# Patient Record
Sex: FEMALE | Race: WHITE | URBAN - NONMETROPOLITAN AREA
[De-identification: names, ages, dates, MRNs, and addresses within clinical notes are randomized per-mention and may not be internally consistent; named-entity substitution may affect disease eponyms.]

---

## 2017-01-19 ENCOUNTER — APPOINTMENT (OUTPATIENT)
Dept: RADIATION ONCOLOGY | Facility: HOSPITAL | Age: 67
End: 2017-01-19
Attending: RADIOLOGY
Payer: COMMERCIAL

## 2017-01-19 NOTE — PROGRESS NOTES
FOLLOWUP NOTE      REFERRING PHYSICIANS:  Delfino Fierro MD; Jules Prince MD; Louis Espino MD      DIAGNOSIS:  Infiltrating ductal carcinoma of the left breast, grade 1.  ER/IA positive, HER-2 negative.  Stage T1c N0 M0.  Two sentinel nodes negative.  A 1.6 cm primary tumor.      SUBJECTIVE:  Katelyn Harris returns for followup and evaluation today having undergone breast preserving surgery.  We had seen her on  after core biopsy only.  She did go to surgery with the above-described findings.  She tells me that she did have some drainage from her incision today and she actually describes a fair quantity of liquid drainage which she collected she says on 2 separate washcloths.  On examination currently, the incision looks quite dry, just a small area of eschar to the superior aspect of the medially located periareolar incision.  Typical postoperative induration is noted.  I really do not appreciate any fluctuance or seroma.      IMPRESSION:  Possible recent drainage as described by the patient.  She is in no hurry to get started with treatment, so to be safe I think we can go ahead and reevaluate her in 2 weeks for potentially continuing with her treatment simulation and planning.         SUZE TERRY MD             D: 2017 13:52   T: 2017 14:12   MT: FIONA      Name:     KATELYN HARRIS   MRN:      [id number]        Account:      [id number]   :      1950           Service Date: 2017      Document: [id number]       cc: Louis Fierro MD

## 2017-02-06 ENCOUNTER — ALLIED HEALTH/NURSE VISIT (OUTPATIENT)
Dept: RADIATION ONCOLOGY | Facility: HOSPITAL | Age: 67
End: 2017-02-06
Attending: RADIOLOGY
Payer: COMMERCIAL

## 2017-02-06 DIAGNOSIS — C50.912 MALIGNANT NEOPLASM OF LEFT FEMALE BREAST, UNSPECIFIED SITE OF BREAST: Primary | ICD-10-CM

## 2017-02-06 PROCEDURE — 77334 RADIATION TREATMENT AID(S): CPT | Performed by: RADIOLOGY

## 2017-02-06 PROCEDURE — 77290 THER RAD SIMULAJ FIELD CPLX: CPT | Performed by: RADIOLOGY

## 2017-02-06 NOTE — PROGRESS NOTES
RADIATION THERAPY SIMULATION NOTE      The patient was brought into the simulation suite and placed in a modified supine position.  Careful alignment was accomplished using orthogonal lasers.  Positioning aided with the use of a vacuum bag.  Arms were in the overhead position.  Treatment area involving the left breast and chest wall was outlined using CT opaque markers.  Imaging was acquired through the breast and chest area.  Isocenter placed.  All imaging will be used for 3D conformal treatment planning for treatment of breast carcinoma in the setting of breast preservation with dose/volume relationships characterized for target body, heart and lung.         SUZE TERRY MD             D: 2017 14:21   T: 2017 14:53   MT: ANDREW      Name:     KATELYN HARRIS   MRN:      [id number]        Account:      [id number]   :      1950           Service Date: 2017      Document: [id number]

## 2017-02-09 PROCEDURE — 77300 RADIATION THERAPY DOSE PLAN: CPT | Performed by: RADIOLOGY

## 2017-02-09 PROCEDURE — 77334 RADIATION TREATMENT AID(S): CPT | Performed by: RADIOLOGY

## 2017-02-09 PROCEDURE — 77295 3-D RADIOTHERAPY PLAN: CPT | Performed by: RADIOLOGY

## 2017-02-10 PROCEDURE — 77331 SPECIAL RADIATION DOSIMETRY: CPT | Performed by: RADIOLOGY

## 2017-02-10 PROCEDURE — 77370 RADIATION PHYSICS CONSULT: CPT | Performed by: RADIOLOGY

## 2017-02-14 ENCOUNTER — ALLIED HEALTH/NURSE VISIT (OUTPATIENT)
Dept: RADIATION ONCOLOGY | Facility: HOSPITAL | Age: 67
End: 2017-02-14
Attending: RADIOLOGY
Payer: COMMERCIAL

## 2017-02-14 DIAGNOSIS — C50.912 MALIGNANT NEOPLASM OF LEFT FEMALE BREAST, UNSPECIFIED SITE OF BREAST: Primary | ICD-10-CM

## 2017-02-14 PROCEDURE — 77280 THER RAD SIMULAJ FIELD SMPL: CPT | Performed by: RADIOLOGY

## 2017-02-14 PROCEDURE — 77417 THER RADIOLOGY PORT IMAGE(S): CPT | Performed by: RADIOLOGY

## 2017-02-14 PROCEDURE — 77412 RADIATION TX DELIVERY LVL 3: CPT | Performed by: RADIOLOGY

## 2017-02-14 NOTE — PROGRESS NOTES
Christie Jackson received radiation therapy treatment today 02/14/17.    Angela Carney  February 14, 2017  11:59 AM

## 2017-02-14 NOTE — MR AVS SNAPSHOT
After Visit Summary   2/14/2017    Christie Jackson    MRN: [id number]           Patient Information     Date Of Birth          1950        Visit Information        Provider Department      2/14/2017 11:30 AM [facility] IX [facility]        Today's Diagnoses     Malignant neoplasm of left female breast, unspecified site of breast (H)    -  1       Follow-ups after your visit        Your next 10 appointments already scheduled     Feb 15, 2017 10:30 AM CST   Treatment with [facility] CL[facility] 2100C   [facility] ([facility] )    [address] [address]   [phone]            Feb 16, 2017 10:30 AM CST   Treatment with HI CLINAC 2100C   [facility] ([facility] )    [address] [address]   [phone]            Feb 17, 2017 10:30 AM CST   Treatment with HI CLINAC 2100C   [facility] ([facility] )    [address] [address]   [phone]            Feb 20, 2017 10:30 AM CST   Treatment with HI CLINAC 2100C   [facility] ([facility] )    [address] [address]   [phone]            Feb 21, 2017 10:30 AM CST   Treatment with HI CLINAC 2100C   [facility] ([facility] )    [address] [address]   [phone]            Feb 22, 2017 10:30 AM CST   Treatment with HI CLINAC 2100C   [facility] ([facility] )    [address] [address]   [phone]            Feb 23, 2017 10:30 AM CST   Treatment with HI CLINAC 2100C   [facility] ([facility] )    [address] [address]   [phone]            Feb 24, 2017 10:30 AM CST   Treatment with HI CLINAC 2100C   [facility] ([facility] )    [address] [address]   [phone]            Feb 27, 2017 10:30 AM CST  "  Treatment with HI CLINAC 2100C   HI Radiation Oncology ([facility] )    [address] [address] [phone]            2017 10:30 AM CST   Treatment with HI CLINAC 2100C   [facility] Radiation Oncology ([facility] )    [address] [address] [phone]              Who to contact     If you have questions or need follow up information about today's clinic visit or your schedule please contact [facility] directly at [phone].  Normal or non-critical lab and imaging results will be communicated to you by Ounce Labshart, letter or phone within 4 business days after the clinic has received the results. If you do not hear from us within 7 days, please contact the clinic through Swiftypet or phone. If you have a critical or abnormal lab result, we will notify you by phone as soon as possible.  Submit refill requests through PushToTest or call your pharmacy and they will forward the refill request to us. Please allow 3 business days for your refill to be completed.          Additional Information About Your Visit        PushToTest Information     PushToTest lets you send messages to your doctor, view your test results, renew your prescriptions, schedule appointments and more. To sign up, go to [email or web address] . Click on \"Log in\" on the left side of the screen, which will take you to the Welcome page. Then click on \"Sign up Now\" on the right side of the page.     You will be asked to enter the access code listed below, as well as some personal information. Please follow the directions to create your username and password.     Your access code is: [email or web address]  Expires: 3/12/2017 10:41 AM     Your access code will  in 90 days. If you need help or a new code, please call your [place] clinic or [phone].        Care EveryWhere ID     This is your Care EveryWhere ID. This could be used by other organizations to access your [place] medical " records  [id number]         Blood Pressure from Last 3 Encounters:   12/12/16 180/88    Weight from Last 3 Encounters:   12/12/16 73.3 kg (161 lb 9.6 oz)              Today, you had the following     No orders found for display       Primary Care Provider Office Phone # Fax #    Jules Prince [phone]       [facility] RANGE [facility] [address]  [facility] [address]        Thank you!     Thank you for choosing [facility]  for your care. Our goal is always to provide you with excellent care. Hearing back from our patients is one way we can continue to improve our services. Please take a few minutes to complete the written survey that you may receive in the mail after your visit with us. Thank you!             Your Updated Medication List - Protect others around you: Learn how to safely use, store and throw away your medicines at www.disposemymeds.org.          This list is accurate as of: 2/14/17 11:59 AM.  Always use your most recent med list.                   Brand Name Dispense Instructions for use    ACTIGALL 300 MG capsule   Generic drug:  ursodiol          aspirin 81 MG tablet      Take 81 mg by mouth       betamethasone valerate 0.1 % ointment    VALISONE         FLUoxetine 20 MG capsule    PROzac         GLUCOSAMINE CHONDROITIN VIT D3 Caps      Take 1 each by mouth       lisinopril 40 MG tablet    PRINIVIL/ZESTRIL     Take 40 mg by mouth       melatonin 3 MG Caps          metFORMIN 1000 MG tablet    GLUCOPHAGE     Take 500 mg by mouth       metoprolol 25 MG 24 hr tablet    TOPROL-XL     Take 25 mg by mouth       PRESERVISION AREDS Tabs

## 2017-02-15 ENCOUNTER — ALLIED HEALTH/NURSE VISIT (OUTPATIENT)
Dept: RADIATION ONCOLOGY | Facility: HOSPITAL | Age: 67
End: 2017-02-15

## 2017-02-15 DIAGNOSIS — C50.912 MALIGNANT NEOPLASM OF LEFT FEMALE BREAST, UNSPECIFIED SITE OF BREAST: Primary | ICD-10-CM

## 2017-02-15 PROCEDURE — 77412 RADIATION TX DELIVERY LVL 3: CPT | Performed by: RADIOLOGY

## 2017-02-15 NOTE — PROGRESS NOTES
Christie Jackson received radiation therapy treatment today 02/15/17.    Lori Glez  February 15, 2017  9:30 AM

## 2017-02-15 NOTE — MR AVS SNAPSHOT
After Visit Summary   2/15/2017    Christie Jackson    MRN: [id number]           Patient Information     Date Of Birth          1950        Visit Information        Provider Department      2/15/2017 9:15 AM [facility] IX [facility] Rad[facility]        Today's Diagnoses     Malignant neoplasm of left female breast, unspecified site of breast (H)    -  1       Follow-ups after your visit        Your next 10 appointments already scheduled     Feb 16, 2017 12:00 PM CST   Treatment with HI CLINAC IX   [facility] Radiation Oncology ([facility] )    [address] [address]   [phone]            Feb 17, 2017  8:00 AM CST   Treatment with HI CLINAC IX   [facility] Radiation Oncology ([facility] )    [address] [address]   [phone]            Feb 20, 2017 10:30 AM CST   Treatment with HI CLINAC 2100C   [facility] Radiation Oncology ([facility] )    [address] [address]   [phone]            Feb 21, 2017 10:30 AM CST   Treatment with HI CLINAC 2100C   [facility] Radiation [facility]cology ([facility] )    [address] [address]   [phone]            Feb 22, 2017 10:30 AM CST   Treatment with HI CLINAC 2100C   [facility] Radiation Oncology ([facility] )    [address] [address]   [phone]            Feb 23, 2017 10:30 AM CST   Treatment with HI CLINAC 2100C   [facility] Radiation Oncology ([facility] )    [address] [address]   [phone]            Feb 24, 2017 10:30 AM CST   Treatment with HI CLINAC 2100C   [facility] Radiation [facility]cology ([facility] )    [address] [address]   [phone]            Feb 27, 2017 10:30 AM CST   Treatment with HI CLINAC 2100C   [facility] Rad[facility]cology ([facility] )    [address] [address]   [phone]            Feb 28, 2017 10:30 AM CST   Treatment with  "HI CLINAC 2100C   [facility] Radiation Oncology ([facility] )    [address] [address] [phone]            Mar 01, 2017 10:30 AM CST   Treatment with HI CLINAC 2100C   [facility] Radiation Oncology ([facility] )    [address] [address] [phone]              Who to contact     If you have questions or need follow up information about today's clinic visit or your schedule please contact [facility] directly at [phone].  Normal or non-critical lab and imaging results will be communicated to you by Beleza na Webhart, letter or phone within 4 business days after the clinic has received the results. If you do not hear from us within 7 days, please contact the clinic through Cutefundt or phone. If you have a critical or abnormal lab result, we will notify you by phone as soon as possible.  Submit refill requests through [employer] or call your pharmacy and they will forward the refill request to us. Please allow 3 business days for your refill to be completed.          Additional Information About Your Visit        [employer] Information     [employer] lets you send messages to your doctor, view your test results, renew your prescriptions, schedule appointments and more. To sign up, go to [email or web address] . Click on \"Log in\" on the left side of the screen, which will take you to the Welcome page. Then click on \"Sign up Now\" on the right side of the page.     You will be asked to enter the access code listed below, as well as some personal information. Please follow the directions to create your username and password.     Your access code is: [email or web address]  Expires: 3/12/2017 10:41 AM     Your access code will  in 90 days. If you need help or a new code, please call your [place] clinic or [phone].        Care EveryWhere ID     This is your Care EveryWhere ID. This could be used by other organizations to access your [place] medical " records  [id number]         Blood Pressure from Last 3 Encounters:   12/12/16 180/88    Weight from Last 3 Encounters:   12/12/16 73.3 kg (161 lb 9.6 oz)              Today, you had the following     No orders found for display       Primary Care Provider Office Phone # Fax #    Jules Prince [phone]       [facility] RANGE [facility] [address]  [facility] [address]        Thank you!     Thank you for choosing [facility]  for your care. Our goal is always to provide you with excellent care. Hearing back from our patients is one way we can continue to improve our services. Please take a few minutes to complete the written survey that you may receive in the mail after your visit with us. Thank you!             Your Updated Medication List - Protect others around you: Learn how to safely use, store and throw away your medicines at www.disposemymeds.org.          This list is accurate as of: 2/15/17  9:31 AM.  Always use your most recent med list.                   Brand Name Dispense Instructions for use    ACTIGALL 300 MG capsule   Generic drug:  ursodiol          aspirin 81 MG tablet      Take 81 mg by mouth       betamethasone valerate 0.1 % ointment    VALISONE         FLUoxetine 20 MG capsule    PROzac         GLUCOSAMINE CHONDROITIN VIT D3 Caps      Take 1 each by mouth       lisinopril 40 MG tablet    PRINIVIL/ZESTRIL     Take 40 mg by mouth       melatonin 3 MG Caps          metFORMIN 1000 MG tablet    GLUCOPHAGE     Take 500 mg by mouth       metoprolol 25 MG 24 hr tablet    TOPROL-XL     Take 25 mg by mouth       PRESERVISION AREDS Tabs

## 2017-02-16 ENCOUNTER — ALLIED HEALTH/NURSE VISIT (OUTPATIENT)
Dept: RADIATION ONCOLOGY | Facility: HOSPITAL | Age: 67
End: 2017-02-16

## 2017-02-16 DIAGNOSIS — C50.912 MALIGNANT NEOPLASM OF LEFT FEMALE BREAST, UNSPECIFIED SITE OF BREAST: Primary | ICD-10-CM

## 2017-02-16 PROCEDURE — 77412 RADIATION TX DELIVERY LVL 3: CPT | Performed by: RADIOLOGY

## 2017-02-16 NOTE — MR AVS SNAPSHOT
After Visit Summary   2/16/2017    Christie Jackson    MRN: [id number]           Patient Information     Date Of Birth          1950        Visit Information        Provider Department      2/16/2017 3:30 PM [facility] IX [facility] Rad[facility] Oncology        Today's Diagnoses     Malignant neoplasm of left female breast, unspecified site of breast (H)    -  1       Follow-ups after your visit        Your next 10 appointments already scheduled     Feb 17, 2017  8:00 AM CST   Treatment with HI CLINAC IX   [facility] Radiation Oncology ([facility] )    [address] [address]   [phone]            Feb 20, 2017 10:15 AM CST   Treatment with HI CLINAC IX   [facility] Radiation Oncology ([facility] )    [address] [address]   [phone]            Feb 21, 2017  2:15 PM CST   Treatment with HI CLINAC IX   [facility] Radiation Oncology ([facility] )    [address] [address]   [phone]            Feb 22, 2017 10:30 AM CST   Treatment with HI CLINAC 2100C   [facility] Radiation [facility]cology ([facility] )    [address] [address]   [phone]            Feb 23, 2017 10:30 AM CST   Treatment with HI CLINAC 2100C   [facility] Rad[facility] ([facility] )    [address] [address]   [phone]            Feb 24, 2017 10:30 AM CST   Treatment with HI CLINAC 2100C   [facility] Radiation Oncology ([facility] )    [address] [address]   [phone]            Feb 27, 2017 10:30 AM CST   Treatment with HI CLINAC 2100C   [facility] Radiation Oncology ([facility] )    [address] [address]   [phone]            Feb 28, 2017 10:30 AM CST   Treatment with HI CLINAC 2100C   [facility] Radiation [facility]cology ([facility] )    [address] [address]   [phone]            Mar 01, 2017 10:30 AM CST   Treatment with HI  "CLINAC 2100C   HI Radiation Oncology ([facility] )    [address] [address] [phone]            Mar 02, 2017 10:30 AM CST   Treatment with HI CLINAC 2100C   [facility] Radiation Oncology ([facility] )    [address] [address] [phone]              Who to contact     If you have questions or need follow up information about today's clinic visit or your schedule please contact [facility] directly at [phone].  Normal or non-critical lab and imaging results will be communicated to you by GoNogginghart, letter or phone within 4 business days after the clinic has received the results. If you do not hear from us within 7 days, please contact the clinic through BAUNATt or phone. If you have a critical or abnormal lab result, we will notify you by phone as soon as possible.  Submit refill requests through Niche or call your pharmacy and they will forward the refill request to us. Please allow 3 business days for your refill to be completed.          Additional Information About Your Visit        Niche Information     Niche lets you send messages to your doctor, view your test results, renew your prescriptions, schedule appointments and more. To sign up, go to [email or web address] . Click on \"Log in\" on the left side of the screen, which will take you to the Welcome page. Then click on \"Sign up Now\" on the right side of the page.     You will be asked to enter the access code listed below, as well as some personal information. Please follow the directions to create your username and password.     Your access code is: [email or web address]  Expires: 3/12/2017 10:41 AM     Your access code will  in 90 days. If you need help or a new code, please call your [place] clinic or [phone].        Care EveryWhere ID     This is your Care EveryWhere ID. This could be used by other organizations to access your [place] medical " records  [id number]         Blood Pressure from Last 3 Encounters:   12/12/16 180/88    Weight from Last 3 Encounters:   12/12/16 73.3 kg (161 lb 9.6 oz)              Today, you had the following     No orders found for display       Primary Care Provider Office Phone # Fax #    Jules Prince [phone]       [facility] RANGE [facility] [address]  [facility] [address]        Thank you!     Thank you for choosing [facility]  for your care. Our goal is always to provide you with excellent care. Hearing back from our patients is one way we can continue to improve our services. Please take a few minutes to complete the written survey that you may receive in the mail after your visit with us. Thank you!             Your Updated Medication List - Protect others around you: Learn how to safely use, store and throw away your medicines at www.disposemymeds.org.          This list is accurate as of: 2/16/17  4:03 PM.  Always use your most recent med list.                   Brand Name Dispense Instructions for use    ACTIGALL 300 MG capsule   Generic drug:  ursodiol          aspirin 81 MG tablet      Take 81 mg by mouth       betamethasone valerate 0.1 % ointment    VALISONE         FLUoxetine 20 MG capsule    PROzac         GLUCOSAMINE CHONDROITIN VIT D3 Caps      Take 1 each by mouth       lisinopril 40 MG tablet    PRINIVIL/ZESTRIL     Take 40 mg by mouth       melatonin 3 MG Caps          metFORMIN 1000 MG tablet    GLUCOPHAGE     Take 500 mg by mouth       metoprolol 25 MG 24 hr tablet    TOPROL-XL     Take 25 mg by mouth       PRESERVISION AREDS Tabs

## 2017-02-16 NOTE — PROGRESS NOTES
Christie Jackson received radiation therapy treatment today 02/16/17.    Piotr Rodriguez  February 16, 2017  4:03 PM

## 2017-02-17 ENCOUNTER — ALLIED HEALTH/NURSE VISIT (OUTPATIENT)
Dept: RADIATION ONCOLOGY | Facility: HOSPITAL | Age: 67
End: 2017-02-17

## 2017-02-17 DIAGNOSIS — C50.912 MALIGNANT NEOPLASM OF LEFT FEMALE BREAST, UNSPECIFIED SITE OF BREAST: Primary | ICD-10-CM

## 2017-02-17 PROCEDURE — 77412 RADIATION TX DELIVERY LVL 3: CPT | Performed by: RADIOLOGY

## 2017-02-17 NOTE — PROGRESS NOTES
Christie Jackson received radiation therapy treatment today 02/17/17.    Suman Leonardo  February 17, 2017  8:04 AM

## 2017-02-17 NOTE — MR AVS SNAPSHOT
After Visit Summary   2/17/2017    Christie Jackson    MRN: [id number]           Patient Information     Date Of Birth          1950        Visit Information        Provider Department      2/17/2017 8:00 AM [facility]        Today's Diagnoses     Malignant neoplasm of left female breast, unspecified site of breast (H)    -  1       Follow-ups after your visit        Your next 10 appointments already scheduled     Feb 20, 2017 10:15 AM CST   Treatment with HI CL[facility] IX   [facility] Rad[facility]cology ([facility] )    [address] [address]   [phone]            Feb 21, 2017  2:15 PM CST   Treatment with [facility] IX   [facility] ([facility] )    [address] [address]   [phone]            Feb 22, 2017 10:30 AM CST   Treatment with HI CLINAC 2100C   [facility] Radiation Oncology ([facility] )    [address] [address]   [phone]            Feb 23, 2017 10:30 AM CST   Treatment with HI CLINAC 2100C   [facility] Rad[facility]cology ([facility] )    [address] [address]   [phone]            Feb 24, 2017 10:30 AM CST   Treatment with HI CLINAC 2100C   [facility] ([facility] )    [address] [address]   [phone]            Feb 27, 2017 10:30 AM CST   Treatment with HI CLINAC 2100C   [facility] ([facility] )    [address] [address]   [phone]            Feb 28, 2017 10:30 AM CST   Treatment with HI CLINAC 2100C   [facility] ([facility] )    [address] [address]   [phone]            Mar 01, 2017 10:30 AM CST   Treatment with HI CLINAC 2100C   [facility] ([facility] )    [address] [address]   [phone]            Mar 02, 2017 10:30 AM CST   Treatment with  "HI CLINAC 2100C   HI Radiation Oncology ([facility] )    [address] [address] [phone]            Mar 03, 2017 10:30 AM CST   Treatment with HI CLINAC 2100C   [facility] Radiation Oncology ([facility] )    [address] [address] [phone]              Who to contact     If you have questions or need follow up information about today's clinic visit or your schedule please contact [facility] directly at [phone].  Normal or non-critical lab and imaging results will be communicated to you by Melon #usemelonhart, letter or phone within 4 business days after the clinic has received the results. If you do not hear from us within 7 days, please contact the clinic through Numerext or phone. If you have a critical or abnormal lab result, we will notify you by phone as soon as possible.  Submit refill requests through [employer] or call your pharmacy and they will forward the refill request to us. Please allow 3 business days for your refill to be completed.          Additional Information About Your Visit        [employer] Information     [employer] lets you send messages to your doctor, view your test results, renew your prescriptions, schedule appointments and more. To sign up, go to [email or web address] . Click on \"Log in\" on the left side of the screen, which will take you to the Welcome page. Then click on \"Sign up Now\" on the right side of the page.     You will be asked to enter the access code listed below, as well as some personal information. Please follow the directions to create your username and password.     Your access code is: [email or web address]  Expires: 3/12/2017 10:41 AM     Your access code will  in 90 days. If you need help or a new code, please call your [place] clinic or [phone].        Care EveryWhere ID     This is your Care EveryWhere ID. This could be used by other organizations to access your [place] medical " records  [id number]         Blood Pressure from Last 3 Encounters:   12/12/16 180/88    Weight from Last 3 Encounters:   12/12/16 73.3 kg (161 lb 9.6 oz)              Today, you had the following     No orders found for display       Primary Care Provider Office Phone # Fax #    Jules Prince [phone]       [facility] RANGE [facility] [address]  [facility] [address]        Thank you!     Thank you for choosing [facility]  for your care. Our goal is always to provide you with excellent care. Hearing back from our patients is one way we can continue to improve our services. Please take a few minutes to complete the written survey that you may receive in the mail after your visit with us. Thank you!             Your Updated Medication List - Protect others around you: Learn how to safely use, store and throw away your medicines at www.disposemymeds.org.          This list is accurate as of: 2/17/17  8:06 AM.  Always use your most recent med list.                   Brand Name Dispense Instructions for use    ACTIGALL 300 MG capsule   Generic drug:  ursodiol          aspirin 81 MG tablet      Take 81 mg by mouth       betamethasone valerate 0.1 % ointment    VALISONE         FLUoxetine 20 MG capsule    PROzac         GLUCOSAMINE CHONDROITIN VIT D3 Caps      Take 1 each by mouth       lisinopril 40 MG tablet    PRINIVIL/ZESTRIL     Take 40 mg by mouth       melatonin 3 MG Caps          metFORMIN 1000 MG tablet    GLUCOPHAGE     Take 500 mg by mouth       metoprolol 25 MG 24 hr tablet    TOPROL-XL     Take 25 mg by mouth       PRESERVISION AREDS Tabs

## 2017-02-20 ENCOUNTER — ALLIED HEALTH/NURSE VISIT (OUTPATIENT)
Dept: RADIATION ONCOLOGY | Facility: HOSPITAL | Age: 67
End: 2017-02-20

## 2017-02-20 DIAGNOSIS — C50.912 MALIGNANT NEOPLASM OF LEFT FEMALE BREAST, UNSPECIFIED SITE OF BREAST: Primary | ICD-10-CM

## 2017-02-20 PROCEDURE — 77412 RADIATION TX DELIVERY LVL 3: CPT | Performed by: RADIOLOGY

## 2017-02-20 PROCEDURE — 77336 RADIATION PHYSICS CONSULT: CPT | Performed by: RADIOLOGY

## 2017-02-20 NOTE — PROGRESS NOTES
Christie Jackson received radiation therapy treatment today 02/20/17.    Suman Leonardo  February 20, 2017  10:20 AM

## 2017-02-20 NOTE — MR AVS SNAPSHOT
After Visit Summary   2/20/2017    Christie Jackson    MRN: [id number]           Patient Information     Date Of Birth          1950        Visit Information        Provider Department      2/20/2017 10:15 AM [facility]        Today's Diagnoses     Malignant neoplasm of left female breast, unspecified site of breast (H)    -  1       Follow-ups after your visit        Your next 10 appointments already scheduled     Feb 21, 2017  2:15 PM CST   Treatment with HI CLINAC IX   [facility] Radiation Oncology ([facility] )    [address] [address]   [phone]            Feb 22, 2017 10:30 AM CST   Treatment with HI CLINAC 2100C   [facility] Radiation Oncology ([facility] )    [address] [address]   [phone]            Feb 23, 2017 10:30 AM CST   Treatment with HI CLINAC 2100C   [facility] Radiation Oncology ([facility] )    [address] [address]   [phone]            Feb 24, 2017 10:30 AM CST   Treatment with HI CLINAC 2100C   [facility] Radiation Oncology ([facility] )    [address] [address]   [phone]            Feb 27, 2017 10:30 AM CST   Treatment with HI CLINAC 2100C   [facility] Radiation Oncology ([facility] )    [address] [address]   [phone]            Feb 28, 2017 10:30 AM CST   Treatment with HI CLINAC 2100C   [facility] Radiation Oncology ([facility] )    [address] [address]   [phone]            Mar 01, 2017 10:30 AM CST   Treatment with HI CLINAC 2100C   [facility] Radiation Oncology ([facility] )    [address] [address]   [phone]            Mar 02, 2017 10:30 AM CST   Treatment with HI CLINAC 2100C   [facility] Radiation Oncology ([facility] )    [address] [address]   [phone]            Mar 03, 2017 10:30 AM CST   Treatment  "with HI CLINAC 2100C   [facility] Radiation Oncology ([facility] )    [address] [address] [phone]            Mar 06, 2017 10:30 AM CST   Treatment with HI CLINAC 2100C   [facility] Radiation Oncology ([facility] )    [address] [address] [phone]              Who to contact     If you have questions or need follow up information about today's clinic visit or your schedule please contact [facility] directly at [phone].  Normal or non-critical lab and imaging results will be communicated to you by MideoMehart, letter or phone within 4 business days after the clinic has received the results. If you do not hear from us within 7 days, please contact the clinic through CellAegis Devicest or phone. If you have a critical or abnormal lab result, we will notify you by phone as soon as possible.  Submit refill requests through Aasonn or call your pharmacy and they will forward the refill request to us. Please allow 3 business days for your refill to be completed.          Additional Information About Your Visit        Aasonn Information     Aasonn lets you send messages to your doctor, view your test results, renew your prescriptions, schedule appointments and more. To sign up, go to [email or web address] . Click on \"Log in\" on the left side of the screen, which will take you to the Welcome page. Then click on \"Sign up Now\" on the right side of the page.     You will be asked to enter the access code listed below, as well as some personal information. Please follow the directions to create your username and password.     Your access code is: [email or web address]  Expires: 3/12/2017 10:41 AM     Your access code will  in 90 days. If you need help or a new code, please call your [place] clinic or [phone].        Care EveryWhere ID     This is your Care EveryWhere ID. This could be used by other organizations to access your [place] medical " records  [id number]         Blood Pressure from Last 3 Encounters:   12/12/16 180/88    Weight from Last 3 Encounters:   12/12/16 73.3 kg (161 lb 9.6 oz)              Today, you had the following     No orders found for display       Primary Care Provider Office Phone # Fax #    Jules Prince [phone]       [facility] RANGE [facility] [address]  [facility] [address]        Thank you!     Thank you for choosing [facility]  for your care. Our goal is always to provide you with excellent care. Hearing back from our patients is one way we can continue to improve our services. Please take a few minutes to complete the written survey that you may receive in the mail after your visit with us. Thank you!             Your Updated Medication List - Protect others around you: Learn how to safely use, store and throw away your medicines at www.disposemymeds.org.          This list is accurate as of: 2/20/17 10:43 AM.  Always use your most recent med list.                   Brand Name Dispense Instructions for use    ACTIGALL 300 MG capsule   Generic drug:  ursodiol          aspirin 81 MG tablet      Take 81 mg by mouth       betamethasone valerate 0.1 % ointment    VALISONE         FLUoxetine 20 MG capsule    PROzac         GLUCOSAMINE CHONDROITIN VIT D3 Caps      Take 1 each by mouth       lisinopril 40 MG tablet    PRINIVIL/ZESTRIL     Take 40 mg by mouth       melatonin 3 MG Caps          metFORMIN 1000 MG tablet    GLUCOPHAGE     Take 500 mg by mouth       metoprolol 25 MG 24 hr tablet    TOPROL-XL     Take 25 mg by mouth       PRESERVISION AREDS Tabs

## 2017-02-21 ENCOUNTER — ALLIED HEALTH/NURSE VISIT (OUTPATIENT)
Dept: RADIATION ONCOLOGY | Facility: HOSPITAL | Age: 67
End: 2017-02-21

## 2017-02-21 DIAGNOSIS — C50.912 MALIGNANT NEOPLASM OF LEFT FEMALE BREAST, UNSPECIFIED SITE OF BREAST: Primary | ICD-10-CM

## 2017-02-21 PROCEDURE — 77417 THER RADIOLOGY PORT IMAGE(S): CPT | Performed by: RADIOLOGY

## 2017-02-21 PROCEDURE — 77412 RADIATION TX DELIVERY LVL 3: CPT | Performed by: RADIOLOGY

## 2017-02-21 NOTE — PROGRESS NOTES
Christie Jackson received radiation therapy treatment today 02/21/17.    Suman Leonardo  February 21, 2017  2:34 PM

## 2017-02-21 NOTE — MR AVS SNAPSHOT
After Visit Summary   2/21/2017    Christie Jackson    MRN: [id number]           Patient Information     Date Of Birth          1950        Visit Information        Provider Department      2/21/2017 2:15 PM [facility] Rad[facility]cology        Today's Diagnoses     Malignant neoplasm of left female breast, unspecified site of breast (H)    -  1       Follow-ups after your visit        Your next 10 appointments already scheduled     Feb 22, 2017 10:30 AM CST   Treatment with HI CLINAC 2100C   [facility] Radiation Oncology ([facility] )    [address] [address]   [phone]            Feb 23, 2017 10:30 AM CST   Treatment with HI CLINAC 2100C   [facility] Radiation Oncology ([facility] )    [address] [address]   [phone]            Feb 24, 2017 10:30 AM CST   Treatment with HI CLINAC 2100C   [facility] Radiation Oncology ([facility] )    [address] [address]   [phone]            Feb 27, 2017 10:30 AM CST   Treatment with HI CLINAC 2100C   [facility] Radiation Oncology ([facility] )    [address] [address]   [phone]            Feb 28, 2017 10:30 AM CST   Treatment with HI CLINAC 2100C   [facility] Radiation Oncology ([facility] )    [address] [address]   [phone]            Mar 01, 2017 10:30 AM CST   Treatment with HI CLINAC 2100C   [facility] Radiation Oncology ([facility] )    [address] [address]   [phone]            Mar 02, 2017 10:30 AM CST   Treatment with HI CLINAC 2100C   [facility] Radiation Oncology ([facility] )    [address] [address]   [phone]            Mar 03, 2017 10:30 AM CST   Treatment with HI CLINAC 2100C   [facility] Radiation Oncology ([facility] )    [address] [address]   [phone]            Mar 06, 2017 10:30 AM CST  "  Treatment with HI CLINAC 2100C   HI Radiation Oncology ([facility] )    [address] [address] [phone]            Mar 07, 2017 10:30 AM CST   Treatment with HI CLINAC 2100C   [facility] Radiation Oncology ([facility] )    [address] [address] [phone]              Who to contact     If you have questions or need follow up information about today's clinic visit or your schedule please contact [facility] directly at [phone].  Normal or non-critical lab and imaging results will be communicated to you by Bensussen Deutschhart, letter or phone within 4 business days after the clinic has received the results. If you do not hear from us within 7 days, please contact the clinic through iGoOn s.r.l.t or phone. If you have a critical or abnormal lab result, we will notify you by phone as soon as possible.  Submit refill requests through Billtrust or call your pharmacy and they will forward the refill request to us. Please allow 3 business days for your refill to be completed.          Additional Information About Your Visit        Billtrust Information     Billtrust lets you send messages to your doctor, view your test results, renew your prescriptions, schedule appointments and more. To sign up, go to [email or web address] . Click on \"Log in\" on the left side of the screen, which will take you to the Welcome page. Then click on \"Sign up Now\" on the right side of the page.     You will be asked to enter the access code listed below, as well as some personal information. Please follow the directions to create your username and password.     Your access code is: [email or web address]  Expires: 3/12/2017 10:41 AM     Your access code will  in 90 days. If you need help or a new code, please call your [place] clinic or [phone].        Care EveryWhere ID     This is your Care EveryWhere ID. This could be used by other organizations to access your [place] medical " records  [id number]         Blood Pressure from Last 3 Encounters:   12/12/16 180/88    Weight from Last 3 Encounters:   12/12/16 73.3 kg (161 lb 9.6 oz)              Today, you had the following     No orders found for display       Primary Care Provider Office Phone # Fax #    Jules Prince [phone]       [facility] RANGE [facility] [address]  [facility] [address]        Thank you!     Thank you for choosing [facility]  for your care. Our goal is always to provide you with excellent care. Hearing back from our patients is one way we can continue to improve our services. Please take a few minutes to complete the written survey that you may receive in the mail after your visit with us. Thank you!             Your Updated Medication List - Protect others around you: Learn how to safely use, store and throw away your medicines at www.disposemymeds.org.          This list is accurate as of: 2/21/17  2:34 PM.  Always use your most recent med list.                   Brand Name Dispense Instructions for use    ACTIGALL 300 MG capsule   Generic drug:  ursodiol          aspirin 81 MG tablet      Take 81 mg by mouth       betamethasone valerate 0.1 % ointment    VALISONE         FLUoxetine 20 MG capsule    PROzac         GLUCOSAMINE CHONDROITIN VIT D3 Caps      Take 1 each by mouth       lisinopril 40 MG tablet    PRINIVIL/ZESTRIL     Take 40 mg by mouth       melatonin 3 MG Caps          metFORMIN 1000 MG tablet    GLUCOPHAGE     Take 500 mg by mouth       metoprolol 25 MG 24 hr tablet    TOPROL-XL     Take 25 mg by mouth       PRESERVISION AREDS Tabs

## 2017-02-22 ENCOUNTER — ALLIED HEALTH/NURSE VISIT (OUTPATIENT)
Dept: RADIATION ONCOLOGY | Facility: HOSPITAL | Age: 67
End: 2017-02-22
Attending: RADIOLOGY
Payer: COMMERCIAL

## 2017-02-22 ENCOUNTER — OFFICE VISIT (OUTPATIENT)
Dept: RADIATION ONCOLOGY | Facility: HOSPITAL | Age: 67
End: 2017-02-22

## 2017-02-22 VITALS
RESPIRATION RATE: 16 BRPM | HEART RATE: 64 BPM | DIASTOLIC BLOOD PRESSURE: 84 MMHG | WEIGHT: 162.2 LBS | BODY MASS INDEX: 26.99 KG/M2 | SYSTOLIC BLOOD PRESSURE: 182 MMHG

## 2017-02-22 DIAGNOSIS — C50.912 MALIGNANT NEOPLASM OF LEFT FEMALE BREAST, UNSPECIFIED SITE OF BREAST: Primary | ICD-10-CM

## 2017-02-22 PROCEDURE — 77412 RADIATION TX DELIVERY LVL 3: CPT | Performed by: RADIOLOGY

## 2017-02-22 ASSESSMENT — PAIN SCALES - GENERAL: PAINLEVEL: NO PAIN (0)

## 2017-02-22 NOTE — PROGRESS NOTES
Christie Jackson received radiation therapy treatment today 02/22/17.    Piotr Rodriguez  February 22, 2017  10:44 AM

## 2017-02-22 NOTE — MR AVS SNAPSHOT
After Visit Summary   2/22/2017    Christie Jackson    MRN: [id number]           Patient Information     Date Of Birth          1950        Visit Information        Provider Department      2/22/2017 10:45 AM Star Maldonado MD [facility] Radiation [facility]cology        Today's Diagnoses     Malignant neoplasm of left female breast, unspecified site of breast (H)    -  1       Follow-ups after your visit        Your next 10 appointments already scheduled     Feb 23, 2017 10:30 AM CST   Treatment with HI CLINAC 2100C   [facility] Radiation Oncology ([facility] )    [address] [address]   [phone]            Feb 24, 2017 10:30 AM CST   Treatment with HI CLINAC 2100C   [facility] Radiation Oncology ([facility] )    [address] [address]   [phone]            Feb 27, 2017 10:30 AM CST   Treatment with HI CLINAC 2100C   [facility] Radiation Oncology ([facility] )    [address] [address]   [phone]            Feb 28, 2017 10:30 AM CST   Treatment with HI CLINAC 2100C   [facility] Radiation Oncology ([facility] )    [address] [address]   [phone]            Mar 01, 2017 10:30 AM CST   Treatment with HI CLINAC 2100C   [facility] Radiation Oncology ([facility] )    [address] [address]   [phone]            Mar 01, 2017 10:45 AM CST   on treatment visit with Star Maldonado MD   [facility] ([facility] )    [address] [address]   [phone]            Mar 02, 2017 10:30 AM CST   Treatment with HI CLINAC 2100C   [facility] Radiation Oncology ([facility] )    [address] [address]   [phone]            Mar 03, 2017 10:30 AM CST   Treatment with HI CLINAC 2100C   [facility] Radiation Oncology ([facility] )    [address] [address]   [phone]            Mar 06, 2017  "10:30 AM CST   Treatment with HI CLINAC 2100C   [facility] Radiation Oncology ([facility] )    [address] [address] [phone]            Mar 07, 2017 10:30 AM CST   Treatment with HI CLINAC 2100C   [facility] Radiation Oncology ([facility] )    [address] [address] [phone]              Who to contact     If you have questions or need follow up information about today's clinic visit or your schedule please contact [facility] directly at [phone].  Normal or non-critical lab and imaging results will be communicated to you by Five Apeshart, letter or phone within 4 business days after the clinic has received the results. If you do not hear from us within 7 days, please contact the clinic through Five Apeshart or phone. If you have a critical or abnormal lab result, we will notify you by phone as soon as possible.  Submit refill requests through [employer]Met[employer] or call your pharmacy and they will forward the refill request to us. Please allow 3 business days for your refill to be completed.          Additional Information About Your Visit        MyChart Information     ThreatMetrix lets you send messages to your doctor, view your test results, renew your prescriptions, schedule appointments and more. To sign up, go to [email or web address] . Click on \"Log in\" on the left side of the screen, which will take you to the Welcome page. Then click on \"Sign up Now\" on the right side of the page.     You will be asked to enter the access code listed below, as well as some personal information. Please follow the directions to create your username and password.     Your access code is: [email or web address]  Expires: 3/12/2017 10:41 AM     Your access code will  in 90 days. If you need help or a new code, please call your [place] clinic or [phone].        Care EveryWhere ID     This is your Care EveryWhere ID. This could be used by other organizations to access your " [place] medical records  [id number]        Your Vitals Were     Pulse Respirations BMI (Body Mass Index)             64 16 26.99 kg/m2          Blood Pressure from Last 3 Encounters:   02/22/17 182/84   12/12/16 180/88    Weight from Last 3 Encounters:   02/22/17 73.6 kg (162 lb 3.2 oz)   12/12/16 73.3 kg (161 lb 9.6 oz)              Today, you had the following     No orders found for display       Primary Care Provider Office Phone # Fax #    Jules PATHAK Niki [phone]       [facility] RANGE [facility] [address] [facility] [address]        Thank you!     Thank you for choosing [facility]  for your care. Our goal is always to provide you with excellent care. Hearing back from our patients is one way we can continue to improve our services. Please take a few minutes to complete the written survey that you may receive in the mail after your visit with us. Thank you!             Your Updated Medication List - Protect others around you: Learn how to safely use, store and throw away your medicines at www.disposemymeds.org.          This list is accurate as of: 2/22/17 11:00 AM.  Always use your most recent med list.                   Brand Name Dispense Instructions for use    ACTIGALL 300 MG capsule   Generic drug:  ursodiol          aspirin 81 MG tablet      Take 81 mg by mouth       betamethasone valerate 0.1 % ointment    VALISONE         FLUoxetine 20 MG capsule    PROzac         GLUCOSAMINE CHONDROITIN VIT D3 Caps      Take 1 each by mouth       lisinopril 40 MG tablet    PRINIVIL/ZESTRIL     Take 40 mg by mouth       MAGNESIUM OXIDE PO      Take 1 capsule by mouth daily       melatonin 3 MG Caps          metFORMIN 1000 MG tablet    GLUCOPHAGE     Take 500 mg by mouth       metoprolol 25 MG 24 hr tablet    TOPROL-XL     Take 25 mg by mouth       PRESERVISION AREDS Tabs          TURMERIC PO      1 each

## 2017-02-22 NOTE — PROGRESS NOTES
RADIATION THERAPY PROGRESS NOTE      DIAGNOSIS:  Infiltrating ductal carcinoma of the left breast, grade 1, 1.6 cm primary tumor.  Stage T1c N0 M0.  ER/NV positive, HER-2 negative.      RADIATION RX:  The patient has received 1400 cGy to date for treatment of low risk breast carcinoma in the setting of breast preservation.      SUBJECTIVE:  Just getting started with treatment, notes little in the way of side effects.      OBJECTIVE:  Weight 162.2 pounds, very mild erythema, likely related to altered lymphatic drainage around her segmentectomy site.  Slight skin puckering at the incision in the inferomedial aspect of the periareolar area.      IMPRESSION:  Just getting started with radiation therapy, doing fine.      PLAN:  Continue treatment as planned.         SUZE TERRY MD             D: 2017 11:19   T: 2017 11:59   MT: SK      Name:     KATELYN HARRIS   MRN:      [id number]        Account:      [id number]   :      1950           Service Date: 2017      Document: [id number]

## 2017-02-22 NOTE — MR AVS SNAPSHOT
After Visit Summary   2/22/2017    Christie Jackson    MRN: [id number]           Patient Information     Date Of Birth          1950        Visit Information        Provider Department      2/22/2017 10:30 AM [facility]        Today's Diagnoses     Malignant neoplasm of left female breast, unspecified site of breast (H)    -  1       Follow-ups after your visit        Your next 10 appointments already scheduled     Feb 22, 2017 10:45 AM CST   on treatment visit with Star Maldonado MD   [facility] ([facility] )    [address] [address]   [phone]            Feb 23, 2017 10:30 AM CST   Treatment with [facility]   [facility] ([facility] )    [address] [address]   [phone]            Feb 24, 2017 10:30 AM CST   Treatment with [facility]   [facility] ([facility] )    [address] [address]   [phone]            Feb 27, 2017 10:30 AM CST   Treatment with [facility]   [facility] ([facility] )    [address] [address]   [phone]            Feb 28, 2017 10:30 AM CST   Treatment with [facility]   [facility] ([facility] )    [address] [address]   [phone]            Mar 01, 2017 10:30 AM CST   Treatment with [facility]   [facility] ([facility] )    [address] [address] [phone]            Mar 01, 2017 10:45 AM CST   on treatment visit with Star Maldonado MD   [facility] ([facility] )    [address] [address]   [phone]            Mar 02, 2017 10:30 AM CST   Treatment with [facility]   [facility] ([facility] )    [address] [address]   [phone]            Mar  "2017 10:30 AM CST   Treatment with [facility] 2100C   [facility] ([facility] )    [address] [address] [phone]            Mar 06, 2017 10:30 AM CST   Treatment with [facility] 2100C   [facility] ([facility] )    [address] [address] [phone]              Who to contact     If you have questions or need follow up information about today's clinic visit or your schedule please contact [facility] directly at [phone].  Normal or non-critical lab and imaging results will be communicated to you by Treemo Labshart, letter or phone within 4 business days after the clinic has received the results. If you do not hear from us within 7 days, please contact the clinic through Scopelyt or phone. If you have a critical or abnormal lab result, we will notify you by phone as soon as possible.  Submit refill requests through FAMOCO or call your pharmacy and they will forward the refill request to us. Please allow 3 business days for your refill to be completed.          Additional Information About Your Visit        MyChart Information     FAMOCO lets you send messages to your doctor, view your test results, renew your prescriptions, schedule appointments and more. To sign up, go to [email or web address] . Click on \"Log in\" on the left side of the screen, which will take you to the Welcome page. Then click on \"Sign up Now\" on the right side of the page.     You will be asked to enter the access code listed below, as well as some personal information. Please follow the directions to create your username and password.     Your access code is: [email or web address]  Expires: 3/12/2017 10:41 AM     Your access code will  in 90 days. If you need help or a new code, please call your [place] clinic or [phone].        Care EveryWhere ID     This is your Care EveryWhere ID. This could be used by other organizations to access " your [place] medical records  [id number]         Blood Pressure from Last 3 Encounters:   12/12/16 180/88    Weight from Last 3 Encounters:   12/12/16 73.3 kg (161 lb 9.6 oz)              Today, you had the following     No orders found for display       Primary Care Provider Office Phone # Fax #    Jules W Niki [phone]       [facility] [address]  [facility] [address]        Thank you!     Thank you for choosing [facility]  for your care. Our goal is always to provide you with excellent care. Hearing back from our patients is one way we can continue to improve our services. Please take a few minutes to complete the written survey that you may receive in the mail after your visit with us. Thank you!             Your Updated Medication List - Protect others around you: Learn how to safely use, store and throw away your medicines at www.disposemymeds.org.          This list is accurate as of: 2/22/17 10:44 AM.  Always use your most recent med list.                   Brand Name Dispense Instructions for use    ACTIGALL 300 MG capsule   Generic drug:  ursodiol          aspirin 81 MG tablet      Take 81 mg by mouth       betamethasone valerate 0.1 % ointment    VALISONE         FLUoxetine 20 MG capsule    PROzac         GLUCOSAMINE CHONDROITIN VIT D3 Caps      Take 1 each by mouth       lisinopril 40 MG tablet    PRINIVIL/ZESTRIL     Take 40 mg by mouth       melatonin 3 MG Caps          metFORMIN 1000 MG tablet    GLUCOPHAGE     Take 500 mg by mouth       metoprolol 25 MG 24 hr tablet    TOPROL-XL     Take 25 mg by mouth       PRESERVISION AREDS Tabs

## 2017-02-22 NOTE — PROGRESS NOTES
"Patient was assessed using the NCCN psychosocial distress thermometer. Patient rated the score as a 4. Patient rated current stressors as \"coming here for txs\". Stressors will be brought to the attention of provider or Oncology RN Care Coordinator for a score of 6 or greater or per nurses discretion.           "

## 2017-02-23 ENCOUNTER — ALLIED HEALTH/NURSE VISIT (OUTPATIENT)
Dept: RADIATION ONCOLOGY | Facility: HOSPITAL | Age: 67
End: 2017-02-23

## 2017-02-23 DIAGNOSIS — C50.912 MALIGNANT NEOPLASM OF LEFT FEMALE BREAST, UNSPECIFIED SITE OF BREAST: Primary | ICD-10-CM

## 2017-02-23 PROCEDURE — 77412 RADIATION TX DELIVERY LVL 3: CPT | Performed by: RADIOLOGY

## 2017-02-23 NOTE — PROGRESS NOTES
Christie Jackson received radiation therapy treatment today 02/23/17.    Lori Glez  February 23, 2017  10:59 AM

## 2017-02-23 NOTE — MR AVS SNAPSHOT
After Visit Summary   2/23/2017    Christie Jackson    MRN: [id number]           Patient Information     Date Of Birth          1950        Visit Information        Provider Department      2/23/2017 10:30 AM [facility]        Today's Diagnoses     Malignant neoplasm of left female breast, unspecified site of breast (H)    -  1       Follow-ups after your visit        Your next 10 appointments already scheduled     Feb 24, 2017 10:30 AM CST   Treatment with [facility]   [facility] ([facility] )    [address] [address]   [phone]            Feb 27, 2017 10:30 AM CST   Treatment with [facility] 2100C   [facility] ([facility] )    [address] [address]   [phone]            Feb 28, 2017 10:30 AM CST   Treatment with [facility]   [facility] ([facility] )    [address] [address]   [phone]            Mar 01, 2017 10:30 AM CST   Treatment with [facility]   [facility] ([facility] )    [address] [address]   [phone]            Mar 01, 2017 10:45 AM CST   on treatment visit with Star Maldonado MD   [facility] ([facility] )    [address] [address]   [phone]            Mar 02, 2017 10:30 AM CST   Treatment with [facility]   [facility] ([facility] )    [address] [address]   [phone]            Mar 03, 2017 10:30 AM CST   Treatment with [facility]   [facility] ([facility] )    [address] [address]   [phone]            Mar 06, 2017 10:30 AM CST   Treatment with [facility]   [facility] ([facility] )    [address] [address]   [phone]            Mar 07, 2017 10:30  "AM CST   Treatment with [facility] 2100C   [facility] Rad[facility] Oncology ([facility] )    [address] [address] [phone]            Mar 08, 2017 10:30 AM CST   Treatment with [facility] 2100C   [facility] ([facility] )    [address] [address] [phone]              Who to contact     If you have questions or need follow up information about today's clinic visit or your schedule please contact [facility] directly at [phone].  Normal or non-critical lab and imaging results will be communicated to you by Alektronahart, letter or phone within 4 business days after the clinic has received the results. If you do not hear from us within 7 days, please contact the clinic through Scoutt or phone. If you have a critical or abnormal lab result, we will notify you by phone as soon as possible.  Submit refill requests through [employer] or call your pharmacy and they will forward the refill request to us. Please allow 3 business days for your refill to be completed.          Additional Information About Your Visit        AlektronaharYolto Information     [employer] lets you send messages to your doctor, view your test results, renew your prescriptions, schedule appointments and more. To sign up, go to [email or web address] . Click on \"Log in\" on the left side of the screen, which will take you to the Welcome page. Then click on \"Sign up Now\" on the right side of the page.     You will be asked to enter the access code listed below, as well as some personal information. Please follow the directions to create your username and password.     Your access code is: [email or web address]  Expires: 3/12/2017 10:41 AM     Your access code will  in 90 days. If you need help or a new code, please call your [place] clinic or [phone].        Care EveryWhere ID     This is your Care EveryWhere ID. This could be used by other organizations to access your [place] " medical records  [id number]         Blood Pressure from Last 3 Encounters:   02/22/17 182/84   12/12/16 180/88    Weight from Last 3 Encounters:   02/22/17 73.6 kg (162 lb 3.2 oz)   12/12/16 73.3 kg (161 lb 9.6 oz)              Today, you had the following     No orders found for display       Primary Care Provider Office Phone # Fax #    Jules W Niki [phone]       [facility] [address]  [facility] [address]        Thank you!     Thank you for choosing [facility]  for your care. Our goal is always to provide you with excellent care. Hearing back from our patients is one way we can continue to improve our services. Please take a few minutes to complete the written survey that you may receive in the mail after your visit with us. Thank you!             Your Updated Medication List - Protect others around you: Learn how to safely use, store and throw away your medicines at www.disposemymeds.org.          This list is accurate as of: 2/23/17 10:59 AM.  Always use your most recent med list.                   Brand Name Dispense Instructions for use    ACTIGALL 300 MG capsule   Generic drug:  ursodiol          aspirin 81 MG tablet      Take 81 mg by mouth       betamethasone valerate 0.1 % ointment    VALISONE         FLUoxetine 20 MG capsule    PROzac         GLUCOSAMINE CHONDROITIN VIT D3 Caps      Take 1 each by mouth       lisinopril 40 MG tablet    PRINIVIL/ZESTRIL     Take 40 mg by mouth       MAGNESIUM OXIDE PO      Take 1 capsule by mouth daily       melatonin 3 MG Caps          metFORMIN 1000 MG tablet    GLUCOPHAGE     Take 500 mg by mouth       metoprolol 25 MG 24 hr tablet    TOPROL-XL     Take 25 mg by mouth       PRESERVISION AREDS Tabs          TURMERIC PO      1 each

## 2017-02-27 ENCOUNTER — ALLIED HEALTH/NURSE VISIT (OUTPATIENT)
Dept: RADIATION ONCOLOGY | Facility: HOSPITAL | Age: 67
End: 2017-02-27

## 2017-02-27 DIAGNOSIS — C50.912 MALIGNANT NEOPLASM OF LEFT FEMALE BREAST, UNSPECIFIED SITE OF BREAST: Primary | ICD-10-CM

## 2017-02-27 PROCEDURE — 77412 RADIATION TX DELIVERY LVL 3: CPT | Performed by: RADIOLOGY

## 2017-02-27 NOTE — PROGRESS NOTES
Christie Jackson received radiation therapy treatment today 02/27/17.    Piotr Rodriguez  February 27, 2017  10:39 AM

## 2017-02-27 NOTE — MR AVS SNAPSHOT
After Visit Summary   2/27/2017    Christie Jackson    MRN: [id number]           Patient Information     Date Of Birth          1950        Visit Information        Provider Department      2/27/2017 10:30 AM [facility]        Today's Diagnoses     Malignant neoplasm of left female breast, unspecified site of breast (H)    -  1       Follow-ups after your visit        Your next 10 appointments already scheduled     Feb 28, 2017 10:30 AM CST   Treatment with [facility] 2100C   [facility] ([facility] )    [address] [address]   [phone]            Mar 01, 2017 10:30 AM CST   Treatment with [facility] 2100C   [facility] ([facility] )    [address] [address]   [phone]            Mar 01, 2017 10:45 AM CST   on treatment visit with Star Maldonado MD   [facility] ([facility] )    [address] [address]   [phone]            Mar 02, 2017 10:30 AM CST   Treatment with [facility] CL[facility] 2100C   [facility] ([facility] )    [address] [address]   [phone]            Mar 03, 2017 10:30 AM CST   Treatment with [facility] 2100C   [facility] ([facility] )    [address] [address]   [phone]            Mar 06, 2017 10:30 AM CST   Treatment with [facility] 2100C   [facility] ([facility] )    [address] [address]   [phone]            Mar 07, 2017 10:30 AM CST   Treatment with [facility] 2100C   [facility] ([facility] )    [address] [address]   [phone]            Mar 08, 2017 10:30 AM CST   Treatment with [facility] 2100C   [facility] ([facility] )    [address] [address]   [phone]            Mar 08, 2017 10:45  "AM CST   on treatment visit with Star Maldonado MD   [facility] ([facility] )    [address] [address] [phone]            Mar 09, 2017 10:30 AM CST   Treatment with [facility] CL[facility] 2100C   [facility] ([facility] )    [address] [address] [phone]              Who to contact     If you have questions or need follow up information about today's clinic visit or your schedule please contact [facility] directly at [phone].  Normal or non-critical lab and imaging results will be communicated to you by NewAerhart, letter or phone within 4 business days after the clinic has received the results. If you do not hear from us within 7 days, please contact the clinic through NewAerhart or phone. If you have a critical or abnormal lab result, we will notify you by phone as soon as possible.  Submit refill requests through Availendar or call your pharmacy and they will forward the refill request to us. Please allow 3 business days for your refill to be completed.          Additional Information About Your Visit        MyChart Information     Availendar lets you send messages to your doctor, view your test results, renew your prescriptions, schedule appointments and more. To sign up, go to [email or web address] . Click on \"Log in\" on the left side of the screen, which will take you to the Welcome page. Then click on \"Sign up Now\" on the right side of the page.     You will be asked to enter the access code listed below, as well as some personal information. Please follow the directions to create your username and password.     Your access code is: [email or web address]  Expires: 3/12/2017 10:41 AM     Your access code will  in 90 days. If you need help or a new code, please call your [place] clinic or [phone].        Care EveryWhere ID     This is your Care EveryWhere ID. This could be used by other organizations to access " your [place] medical records  [id number]         Blood Pressure from Last 3 Encounters:   02/22/17 182/84   12/12/16 180/88    Weight from Last 3 Encounters:   02/22/17 73.6 kg (162 lb 3.2 oz)   12/12/16 73.3 kg (161 lb 9.6 oz)              Today, you had the following     No orders found for display       Primary Care Provider Office Phone # Fax #    Arrey ZO [place] [phone]       [facility] [address]  [facility] [address]        Thank you!     Thank you for choosing [facility]  for your care. Our goal is always to provide you with excellent care. Hearing back from our patients is one way we can continue to improve our services. Please take a few minutes to complete the written survey that you may receive in the mail after your visit with us. Thank you!             Your Updated Medication List - Protect others around you: Learn how to safely use, store and throw away your medicines at www.disposemymeds.org.          This list is accurate as of: 2/27/17 10:39 AM.  Always use your most recent med list.                   Brand Name Dispense Instructions for use    ACTIGALL 300 MG capsule   Generic drug:  ursodiol          aspirin 81 MG tablet      Take 81 mg by mouth       betamethasone valerate 0.1 % ointment    VALISONE         FLUoxetine 20 MG capsule    PROzac         GLUCOSAMINE CHONDROITIN VIT D3 Caps      Take 1 each by mouth       lisinopril 40 MG tablet    PRINIVIL/ZESTRIL     Take 40 mg by mouth       MAGNESIUM OXIDE PO      Take 1 capsule by mouth daily       melatonin 3 MG Caps          metFORMIN 1000 MG tablet    GLUCOPHAGE     Take 500 mg by mouth       metoprolol 25 MG 24 hr tablet    TOPROL-XL     Take 25 mg by mouth       PRESERVISION AREDS Tabs          TURMERIC PO      1 each

## 2017-02-28 ENCOUNTER — ALLIED HEALTH/NURSE VISIT (OUTPATIENT)
Dept: RADIATION ONCOLOGY | Facility: HOSPITAL | Age: 67
End: 2017-02-28

## 2017-02-28 DIAGNOSIS — C50.912 MALIGNANT NEOPLASM OF LEFT FEMALE BREAST, UNSPECIFIED SITE OF BREAST: Primary | ICD-10-CM

## 2017-02-28 PROCEDURE — 77336 RADIATION PHYSICS CONSULT: CPT | Performed by: RADIOLOGY

## 2017-02-28 PROCEDURE — 77412 RADIATION TX DELIVERY LVL 3: CPT | Performed by: RADIOLOGY

## 2017-02-28 NOTE — MR AVS SNAPSHOT
After Visit Summary   2/28/2017    Christie Jackson    MRN: [id number]           Patient Information     Date Of Birth          1950        Visit Information        Provider Department      2/28/2017 10:30 AM [facility]        Today's Diagnoses     Malignant neoplasm of left female breast, unspecified site of breast (H)    -  1       Follow-ups after your visit        Your next 10 appointments already scheduled     Mar 01, 2017 10:30 AM CST   Treatment with [facility] CL[facility] 2100C   [facility] ([facility] )    [address] [address]   [phone]            Mar 01, 2017 10:45 AM CST   on treatment visit with Star Maldonado MD   [facility] ([facility] )    [address] [address] [phone]            Mar 02, 2017 10:30 AM CST   Treatment with HI CL[facility] 2100C   [facility] ([facility] )    [address] [address] [phone]            Mar 03, 2017 10:30 AM CST   Treatment with HI CL[facility] 2100C   [facility] ([facility] )    [address] [address] [phone]            Mar 06, 2017 10:30 AM CST   Treatment with HI CL[facility] 2100C   [facility] ([facility] )    [address] [address]   [phone]            Mar 07, 2017 10:30 AM CST   Treatment with HI CL[facility] 2100C   [facility] ([facility] )    [address] [address] [phone]            Mar 08, 2017 10:30 AM CST   Treatment with [facility] CL[facility] 2100C   [facility] ([facility] )    [address] [address]   [phone]            Mar 08, 2017 10:45 AM CST   on treatment visit with Star Maldonado MD   [facility] ([facility] )    [address] [address]   [phone]            Mar  "2017 10:30 AM CST   Treatment with HI CL[facility] 2100C   [facility] ([facility] )    [address] [address] [phone]            Mar 10, 2017 10:30 AM CST   Treatment with HI CL[facility] 2100C   [facility] ([facility] )    [address] [address] [phone]              Who to contact     If you have questions or need follow up information about today's clinic visit or your schedule please contact [facility] directly at [phone].  Normal or non-critical lab and imaging results will be communicated to you by Choice Sports Traininghart, letter or phone within 4 business days after the clinic has received the results. If you do not hear from us within 7 days, please contact the clinic through Calxedat or phone. If you have a critical or abnormal lab result, we will notify you by phone as soon as possible.  Submit refill requests through Bomboard or call your pharmacy and they will forward the refill request to us. Please allow 3 business days for your refill to be completed.          Additional Information About Your Visit        MyChart Information     Bomboard lets you send messages to your doctor, view your test results, renew your prescriptions, schedule appointments and more. To sign up, go to [email or web address] . Click on \"Log in\" on the left side of the screen, which will take you to the Welcome page. Then click on \"Sign up Now\" on the right side of the page.     You will be asked to enter the access code listed below, as well as some personal information. Please follow the directions to create your username and password.     Your access code is: [email or web address]  Expires: 3/12/2017 10:41 AM     Your access code will  in 90 days. If you need help or a new code, please call your [place] clinic or [phone].        Care EveryWhere ID     This is your Care EveryWhere ID. This could be used by other organizations to access " your [place] medical records  [id number]         Blood Pressure from Last 3 Encounters:   02/22/17 182/84   12/12/16 180/88    Weight from Last 3 Encounters:   02/22/17 73.6 kg (162 lb 3.2 oz)   12/12/16 73.3 kg (161 lb 9.6 oz)              Today, you had the following     No orders found for display       Primary Care Provider Office Phone # Fax #    [place] ZO [place] [phone]       [facility] [address]  [facility] [address]        Thank you!     Thank you for choosing [facility]  for your care. Our goal is always to provide you with excellent care. Hearing back from our patients is one way we can continue to improve our services. Please take a few minutes to complete the written survey that you may receive in the mail after your visit with us. Thank you!             Your Updated Medication List - Protect others around you: Learn how to safely use, store and throw away your medicines at www.disposemymeds.org.          This list is accurate as of: 2/28/17 10:42 AM.  Always use your most recent med list.                   Brand Name Dispense Instructions for use    ACTIGALL 300 MG capsule   Generic drug:  ursodiol          aspirin 81 MG tablet      Take 81 mg by mouth       betamethasone valerate 0.1 % ointment    VALISONE         FLUoxetine 20 MG capsule    PROzac         GLUCOSAMINE CHONDROITIN VIT D3 Caps      Take 1 each by mouth       lisinopril 40 MG tablet    PRINIVIL/ZESTRIL     Take 40 mg by mouth       MAGNESIUM OXIDE PO      Take 1 capsule by mouth daily       melatonin 3 MG Caps          metFORMIN 1000 MG tablet    GLUCOPHAGE     Take 500 mg by mouth       metoprolol 25 MG 24 hr tablet    TOPROL-XL     Take 25 mg by mouth       PRESERVISION AREDS Tabs          TURMERIC PO      1 each

## 2017-02-28 NOTE — PROGRESS NOTES
Christie Jackson received radiation therapy treatment today 02/28/17.    Piotr Rodriguez  February 28, 2017  10:41 AM

## 2017-03-01 ENCOUNTER — OFFICE VISIT (OUTPATIENT)
Dept: RADIATION ONCOLOGY | Facility: HOSPITAL | Age: 67
End: 2017-03-01
Attending: RADIOLOGY
Payer: COMMERCIAL

## 2017-03-01 VITALS
WEIGHT: 162.6 LBS | BODY MASS INDEX: 27.06 KG/M2 | RESPIRATION RATE: 16 BRPM | DIASTOLIC BLOOD PRESSURE: 80 MMHG | HEART RATE: 60 BPM | SYSTOLIC BLOOD PRESSURE: 168 MMHG

## 2017-03-01 DIAGNOSIS — C50.912 MALIGNANT NEOPLASM OF LEFT FEMALE BREAST, UNSPECIFIED SITE OF BREAST: Primary | ICD-10-CM

## 2017-03-01 PROCEDURE — 77417 THER RADIOLOGY PORT IMAGE(S): CPT | Performed by: RADIOLOGY

## 2017-03-01 PROCEDURE — 77412 RADIATION TX DELIVERY LVL 3: CPT | Performed by: RADIOLOGY

## 2017-03-01 NOTE — PROGRESS NOTES
RADIATION THERAPY PROGRESS NOTE       REFERRING PHYSICIANS:  Delfino Fierro MD, Jules Prince MD, Louis Espino MD      DIAGNOSIS:  Infiltrating ductal carcinoma of the left breast, grade 1.  ER/AL positive, HER-2 negative, 1.6 cm primary tumor.  Stage T1c N0 M0.      RADIATION RX:  The patient has received 2200 cGy to date for treatment of low risk breast carcinoma in the setting of breast preservation.      SUBJECTIVE:  She is doing fine with treatment, notes little in the way of side effects.      OBJECTIVE:  Weight 162.6 pounds, mild erythema if at all within the treatment volume.  Segmentectomy site remains intact.      IMPRESSION:  Routine tolerance to radiation therapy for breast preservation.      PLAN:  Continue treatment as planned.         SUZE TERRY MD             D: 2017 11:13   T: 2017 11:30   MT: ANDREW      Name:     KATELYN HARRIS   MRN:      [id number]        Account:      [id number]   :      1950           Service Date: 2017      Document: [id number]       cc: Louis Fierro MD

## 2017-03-01 NOTE — MR AVS SNAPSHOT
After Visit Summary   3/1/2017    Christie Jackson    MRN: [id number]           Patient Information     Date Of Birth          1950        Visit Information        Provider Department      3/1/2017 10:30 AM [facility]        Today's Diagnoses     Malignant neoplasm of left female breast, unspecified site of breast (H)    -  1       Follow-ups after your visit        Your next 10 appointments already scheduled     Mar 02, 2017 10:15 AM CST   Treatment with [facility] IX   [facility] ([facility] )    [address] [address]   [phone]            Mar 03, 2017 10:30 AM CST   Treatment with [facility] 2100C   [facility] ([facility] )    [address] [address]   [phone]            Mar 06, 2017 10:30 AM CST   Treatment with [facility] 2100C   [facility] ([facility] )    [address] [address]   [phone]            Mar 07, 2017 10:30 AM CST   Treatment with [facility] 2100C   [facility] ([facility] )    [address] [address]   [phone]            Mar 08, 2017 10:30 AM CST   Treatment with [facility] 2100C   [facility] ([facility] )    [address] [address]   [phone]            Mar 08, 2017 10:45 AM CST   on treatment visit with Star Maldonado MD   [facility] ([facility] )    [address] [address]   [phone]            Mar 09, 2017 10:30 AM CST   Treatment with [facility] 2100C   [facility] ([facility] )    [address] [address]   [phone]            Mar 10, 2017 10:30 AM CST   Treatment with [facility] 2100C   [facility] ([facility] )    [address] [address]   [phone]            Mar 13, 2017 10:30 AM  "CDT   Treatment with [facility] CL[facility] 2100C   [facility] ([facility] )    [address] [address] [phone]            Mar 14, 2017 10:30 AM CDT   Treatment with [facility] 2100C   [facility] ([facility] )    [address] [address] [phone]              Who to contact     If you have questions or need follow up information about today's clinic visit or your schedule please contact [facility] directly at [phone].  Normal or non-critical lab and imaging results will be communicated to you by studdexhart, letter or phone within 4 business days after the clinic has received the results. If you do not hear from us within 7 days, please contact the clinic through [employer] or phone. If you have a critical or abnormal lab result, we will notify you by phone as soon as possible.  Submit refill requests through [employer] or call your pharmacy and they will forward the refill request to us. Please allow 3 business days for your refill to be completed.          Additional Information About Your Visit        studdexhart Information     [employer] lets you send messages to your doctor, view your test results, renew your prescriptions, schedule appointments and more. To sign up, go to [email or web address] . Click on \"Log in\" on the left side of the screen, which will take you to the Welcome page. Then click on \"Sign up Now\" on the right side of the page.     You will be asked to enter the access code listed below, as well as some personal information. Please follow the directions to create your username and password.     Your access code is: [email or web address]  Expires: 3/12/2017 10:41 AM     Your access code will  in 90 days. If you need help or a new code, please call your [place] clinic or [phone].        Care EveryWhere ID     This is your Care EveryWhere ID. This could be used by other organizations to access your [place] " medical records  [id number]         Blood Pressure from Last 3 Encounters:   03/01/17 168/80   02/22/17 182/84   12/12/16 180/88    Weight from Last 3 Encounters:   03/01/17 73.8 kg (162 lb 9.6 oz)   02/22/17 73.6 kg (162 lb 3.2 oz)   12/12/16 73.3 kg (161 lb 9.6 oz)              Today, you had the following     No orders found for display       Primary Care Provider Office Phone # Fax #    Jules PATHAK [place] [phone]       [facility] [address] [facility] [place] [address]        Thank you!     Thank you for choosing [facility]  for your care. Our goal is always to provide you with excellent care. Hearing back from our patients is one way we can continue to improve our services. Please take a few minutes to complete the written survey that you may receive in the mail after your visit with us. Thank you!             Your Updated Medication List - Protect others around you: Learn how to safely use, store and throw away your medicines at www.disposemymeds.org.          This list is accurate as of: 3/1/17  1:35 PM.  Always use your most recent med list.                   Brand Name Dispense Instructions for use    ACTIGALL 300 MG capsule   Generic drug:  ursodiol          aspirin 81 MG tablet      Take 81 mg by mouth       betamethasone valerate 0.1 % ointment    VALISONE         FLUoxetine 20 MG capsule    PROzac         GLUCOSAMINE CHONDROITIN VIT D3 Caps      Take 1 each by mouth       lisinopril 40 MG tablet    PRINIVIL/ZESTRIL     Take 40 mg by mouth       MAGNESIUM OXIDE PO      Take 1 capsule by mouth daily       melatonin 3 MG Caps          metFORMIN 1000 MG tablet    GLUCOPHAGE     Take 500 mg by mouth       metoprolol 25 MG 24 hr tablet    TOPROL-XL     Take 25 mg by mouth       NYQUIL PO      Take by mouth nightly as needed (for sleep)       PRESERVISION AREDS Tabs          TURMERIC PO      1 each

## 2017-03-01 NOTE — PROGRESS NOTES
"Patient was assessed using the NCCN psychosocial distress thermometer. Patient rated the score as a 4. Patient rated current stressors as \"nothing in particular\". Stressors will be brought to the attention of provider or Oncology RN Care Coordinator for a score of 6 or greater or per nurses discretion.           "

## 2017-03-01 NOTE — PROGRESS NOTES
Christie Jackson received radiation therapy treatment today 03/01/17.    Suman Leonardo  March 1, 2017  1:34 PM

## 2017-03-01 NOTE — MR AVS SNAPSHOT
After Visit Summary   3/1/2017    Christie Jackson    MRN: [id number]           Patient Information     Date Of Birth          1950        Visit Information        Provider Department      3/1/2017 10:45 AM Star Maldonado MD [facility]        Today's Diagnoses     Malignant neoplasm of left female breast, unspecified site of breast (H)    -  1       Follow-ups after your visit        Your next 10 appointments already scheduled     Mar 02, 2017 10:15 AM CST   Treatment with [facility] CL[facility] IX   [facility] Radiation [facility]cology ([facility] )    [address] [address]   [phone]            Mar 03, 2017 10:30 AM CST   Treatment with [facility] CL[facility] 2100C   [facility] ([facility] )    [address] [address]   [phone]            Mar 06, 2017 10:30 AM CST   Treatment with [facility] CL[facility] 2100C   [facility] ([facility] )    [address] [address]   [phone]            Mar 07, 2017 10:30 AM CST   Treatment with [facility] CL[facility] 2100C   [facility] ([facility] )    [address] [address]   [phone]            Mar 08, 2017 10:30 AM CST   Treatment with [facility] CL[facility] 2100C   [facility] ([facility] )    [address] [address]   [phone]            Mar 08, 2017 10:45 AM CST   on treatment visit with Star Maldonado MD   [facility] ([facility] )    [address] [address]   [phone]            Mar 09, 2017 10:30 AM CST   Treatment with [facility] CL[facility] 2100C   [facility] ([facility] )    [address] [address]   [phone]            Mar 10, 2017 10:30 AM CST   Treatment with [facility] CL[facility] 2100C   [facility] ([facility] )    [address] [address]   [phone]            Mar 13, 2017 10:30  "AM CDT   Treatment with HI CLINAC 2100C   [facility] Radiation Oncology ([facility] )    [address] [address] [phone]            Mar 14, 2017 10:30 AM CDT   Treatment with HI CLINAC 2100C   [facility] Rad[facility]cology ([facility] )    [address] [address] [phone]              Who to contact     If you have questions or need follow up information about today's clinic visit or your schedule please contact [facility] directly at [phone].  Normal or non-critical lab and imaging results will be communicated to you by HaloSourcehart, letter or phone within 4 business days after the clinic has received the results. If you do not hear from us within 7 days, please contact the clinic through [employer] or phone. If you have a critical or abnormal lab result, we will notify you by phone as soon as possible.  Submit refill requests through S[employer]V or call your pharmacy and they will forward the refill request to us. Please allow 3 business days for your refill to be completed.          Additional Information About Your Visit        HaloSourcehart Information     SSEV lets you send messages to your doctor, view your test results, renew your prescriptions, schedule appointments and more. To sign up, go to [email or web address] . Click on \"Log in\" on the left side of the screen, which will take you to the Welcome page. Then click on \"Sign up Now\" on the right side of the page.     You will be asked to enter the access code listed below, as well as some personal information. Please follow the directions to create your username and password.     Your access code is: [email or web address]  Expires: 3/12/2017 10:41 AM     Your access code will  in 90 days. If you need help or a new code, please call your [place] clinic or [phone].        Care EveryWhere ID     This is your Care EveryWhere ID. This could be used by other organizations to access your [place] " medical records  [id number]        Your Vitals Were     Pulse Respirations BMI (Body Mass Index)             60 16 27.06 kg/m2          Blood Pressure from Last 3 Encounters:   03/01/17 168/80   02/22/17 182/84   12/12/16 180/88    Weight from Last 3 Encounters:   03/01/17 73.8 kg (162 lb 9.6 oz)   02/22/17 73.6 kg (162 lb 3.2 oz)   12/12/16 73.3 kg (161 lb 9.6 oz)              Today, you had the following     No orders found for display       Primary Care Provider Office Phone # Fax #    Jules W Niki [phone]       [facility] [address] [facility] [address]        Thank you!     Thank you for choosing [facility]  for your care. Our goal is always to provide you with excellent care. Hearing back from our patients is one way we can continue to improve our services. Please take a few minutes to complete the written survey that you may receive in the mail after your visit with us. Thank you!             Your Updated Medication List - Protect others around you: Learn how to safely use, store and throw away your medicines at www.disposemymeds.org.          This list is accurate as of: 3/1/17  2:35 PM.  Always use your most recent med list.                   Brand Name Dispense Instructions for use    ACTIGALL 300 MG capsule   Generic drug:  ursodiol          aspirin 81 MG tablet      Take 81 mg by mouth       betamethasone valerate 0.1 % ointment    VALISONE         FLUoxetine 20 MG capsule    PROzac         GLUCOSAMINE CHONDROITIN VIT D3 Caps      Take 1 each by mouth       lisinopril 40 MG tablet    PRINIVIL/ZESTRIL     Take 40 mg by mouth       MAGNESIUM OXIDE PO      Take 1 capsule by mouth daily       melatonin 3 MG Caps          metFORMIN 1000 MG tablet    GLUCOPHAGE     Take 500 mg by mouth       metoprolol 25 MG 24 hr tablet    TOPROL-XL     Take 25 mg by mouth       NYQUIL PO      Take by mouth nightly as needed (for sleep)       PRESERVISION AREDS Tabs           TURMERIC PO      1 each

## 2017-03-02 ENCOUNTER — ALLIED HEALTH/NURSE VISIT (OUTPATIENT)
Dept: RADIATION ONCOLOGY | Facility: HOSPITAL | Age: 67
End: 2017-03-02
Attending: RADIOLOGY
Payer: COMMERCIAL

## 2017-03-02 DIAGNOSIS — C50.912 MALIGNANT NEOPLASM OF LEFT FEMALE BREAST, UNSPECIFIED SITE OF BREAST: Primary | ICD-10-CM

## 2017-03-02 PROCEDURE — 77300 RADIATION THERAPY DOSE PLAN: CPT | Performed by: RADIOLOGY

## 2017-03-02 PROCEDURE — 77412 RADIATION TX DELIVERY LVL 3: CPT | Performed by: RADIOLOGY

## 2017-03-02 NOTE — MR AVS SNAPSHOT
After Visit Summary   3/2/2017    Christie Jackson    MRN: [id number]           Patient Information     Date Of Birth          1950        Visit Information        Provider Department      3/2/2017 10:15 AM [facility] IX [facility]        Today's Diagnoses     Malignant neoplasm of left female breast, unspecified site of breast (H)    -  1       Follow-ups after your visit        Your next 10 appointments already scheduled     Mar 03, 2017 10:30 AM CST   Treatment with HI CLINAC 2100C   [facility] ([facility] )    [address] [address]   [phone]            Mar 06, 2017 10:30 AM CST   Treatment with HI CLINAC 2100C   [facility] ([facility] )    [address] [address]   [phone]            Mar 07, 2017 10:30 AM CST   Treatment with HI CLIN[facility] 2100C   [facility] ([facility] )    [address] [address]   [phone]            Mar 08, 2017 10:30 AM CST   Treatment with HI CL[facility] 2100C   [facility] ([facility] )    [address] [address]   [phone]            Mar 08, 2017 10:45 AM CST   on treatment visit with Star Maldonado MD   [facility] ([facility] )    [address] [address]   [phone]            Mar 09, 2017 10:30 AM CST   Treatment with HI CLINAC 2100C   [facility] ([facility] )    [address] [address]   [phone]            Mar 10, 2017 10:30 AM CST   Treatment with HI CLIN[facility] 2100C   [facility] ([facility] )    [address] [address]   [phone]            Mar 13, 2017 10:30 AM CDT   Treatment with HI CLIN[facility] 2100C   [facility] ([facility] )    [address] [address]   [phone]            Mar 14, 2017 10:30 AM  "CDT   Treatment with HI CLINAC 2100C   [facility] Radiation Oncology ([facility] )    [address] [address] [phone]            Mar 15, 2017 10:30 AM CDT   Treatment with HI CLINAC 2100C   [facility]iation [facility]cology ([facility] )    [address] [address] [phone]              Who to contact     If you have questions or need follow up information about today's clinic visit or your schedule please contact [facility] directly at [phone].  Normal or non-critical lab and imaging results will be communicated to you by Framed Datahart, letter or phone within 4 business days after the clinic has received the results. If you do not hear from us within 7 days, please contact the clinic through [employer] or phone. If you have a critical or abnormal lab result, we will notify you by phone as soon as possible.  Submit refill requests through [employer] or call your pharmacy and they will forward the refill request to us. Please allow 3 business days for your refill to be completed.          Additional Information About Your Visit        Framed Datahart Information     Huayi lets you send messages to your doctor, view your test results, renew your prescriptions, schedule appointments and more. To sign up, go to [email or web address] . Click on \"Log in\" on the left side of the screen, which will take you to the Welcome page. Then click on \"Sign up Now\" on the right side of the page.     You will be asked to enter the access code listed below, as well as some personal information. Please follow the directions to create your username and password.     Your access code is: [email or web address]  Expires: 3/12/2017 10:41 AM     Your access code will  in 90 days. If you need help or a new code, please call your [place] clinic or [phone].        Care EveryWhere ID     This is your Care EveryWhere ID. This could be used by other organizations to access your [place] " medical records  [id number]         Blood Pressure from Last 3 Encounters:   03/01/17 168/80   02/22/17 182/84   12/12/16 180/88    Weight from Last 3 Encounters:   03/01/17 73.8 kg (162 lb 9.6 oz)   02/22/17 73.6 kg (162 lb 3.2 oz)   12/12/16 73.3 kg (161 lb 9.6 oz)              Today, you had the following     No orders found for display       Primary Care Provider Office Phone # Fax #    Jules PATHAK Niki [phone]       [facility] R[facility] [address] [facility] [address]        Thank you!     Thank you for choosing [facility]  for your care. Our goal is always to provide you with excellent care. Hearing back from our patients is one way we can continue to improve our services. Please take a few minutes to complete the written survey that you may receive in the mail after your visit with us. Thank you!             Your Updated Medication List - Protect others around you: Learn how to safely use, store and throw away your medicines at www.disposemymeds.org.          This list is accurate as of: 3/2/17 10:19 AM.  Always use your most recent med list.                   Brand Name Dispense Instructions for use    ACTIGALL 300 MG capsule   Generic drug:  ursodiol          aspirin 81 MG tablet      Take 81 mg by mouth       betamethasone valerate 0.1 % ointment    VALISONE         FLUoxetine 20 MG capsule    PROzac         GLUCOSAMINE CHONDROITIN VIT D3 Caps      Take 1 each by mouth       lisinopril 40 MG tablet    PRINIVIL/ZESTRIL     Take 40 mg by mouth       MAGNESIUM OXIDE PO      Take 1 capsule by mouth daily       melatonin 3 MG Caps          metFORMIN 1000 MG tablet    GLUCOPHAGE     Take 500 mg by mouth       metoprolol 25 MG 24 hr tablet    TOPROL-XL     Take 25 mg by mouth       NYQUIL PO      Take by mouth nightly as needed (for sleep)       PRESERVISION AREDS Tabs          TURMERIC PO      1 each

## 2017-03-02 NOTE — PROGRESS NOTES
Christie Jackson received radiation therapy treatment today 03/02/17.    Piotr Rodriguez  March 2, 2017  10:18 AM

## 2017-03-03 ENCOUNTER — ALLIED HEALTH/NURSE VISIT (OUTPATIENT)
Dept: RADIATION ONCOLOGY | Facility: HOSPITAL | Age: 67
End: 2017-03-03

## 2017-03-03 DIAGNOSIS — C50.912 MALIGNANT NEOPLASM OF LEFT FEMALE BREAST, UNSPECIFIED SITE OF BREAST: Primary | ICD-10-CM

## 2017-03-03 PROCEDURE — 77412 RADIATION TX DELIVERY LVL 3: CPT | Performed by: RADIOLOGY

## 2017-03-03 NOTE — MR AVS SNAPSHOT
After Visit Summary   3/3/2017    Christie Jackson    MRN: [id number]           Patient Information     Date Of Birth          1950        Visit Information        Provider Department      3/3/2017 10:30 AM [facility]        Today's Diagnoses     Malignant neoplasm of left female breast, unspecified site of breast (H)    -  1       Follow-ups after your visit        Your next 10 appointments already scheduled     Mar 06, 2017 10:30 AM CST   Treatment with [facility] CL[facility] 2100C   [facility] ([facility] )    [address] [address]   [phone]            Mar 07, 2017 10:30 AM CST   Treatment with [facility] CL[facility] 2100C   [facility] ([facility] )    [address] [address]   [phone]            Mar 08, 2017 10:30 AM CST   Treatment with [facility] 2100C   [facility] ([facility] )    [address] [address]   [phone]            Mar 08, 2017 10:45 AM CST   on treatment visit with Star Maldonado MD   [facility] ([facility] )    [address] [address]   [phone]            Mar 09, 2017 10:30 AM CST   Treatment with [facility] 2100C   [facility] ([facility] )    [address] [address]   [phone]            Mar 10, 2017 10:30 AM CST   Treatment with [facility] CL[facility] 2100C   [facility] ([facility] )    [address] [address]   [phone]            Mar 13, 2017 10:30 AM CDT   Treatment with [facility] CL[facility] 2100C   [facility] ([facility] )    [address] [address]   [phone]            Mar 14, 2017 10:30 AM CDT   Treatment with [facility] CL[facility] 2100C   [facility] ([facility] )    [address] [address]   [phone]            Mar 15, 2017 10:30 AM  "CDT   Treatment with HI CLINAC 2100C   [facility] ([facility] )    [address] [address] [phone]            Mar 15, 2017 10:45 AM CDT   on treatment visit with Star Maldonado MD   [facility] ([facility] )    [address] [address] [phone]              Who to contact     If you have questions or need follow up information about today's clinic visit or your schedule please contact [facility] directly at [phone].  Normal or non-critical lab and imaging results will be communicated to you by [employer], letter or phone within 4 business days after the clinic has received the results. If you do not hear from us within 7 days, please contact the clinic through [employer] or phone. If you have a critical or abnormal lab result, we will notify you by phone as soon as possible.  Submit refill requests through [employer] or call your pharmacy and they will forward the refill request to us. Please allow 3 business days for your refill to be completed.          Additional Information About Your Visit        MyChart Information     [employer] lets you send messages to your doctor, view your test results, renew your prescriptions, schedule appointments and more. To sign up, go to [email or web address] . Click on \"Log in\" on the left side of the screen, which will take you to the Welcome page. Then click on \"Sign up Now\" on the right side of the page.     You will be asked to enter the access code listed below, as well as some personal information. Please follow the directions to create your username and password.     Your access code is: [email or web address]  Expires: 3/12/2017 10:41 AM     Your access code will  in 90 days. If you need help or a new code, please call your [place] clinic or [phone].        Care EveryWhere ID     This is your Care EveryWhere ID. This could be used by other organizations to access your " [place] medical records  [id number]         Blood Pressure from Last 3 Encounters:   03/01/17 168/80   02/22/17 182/84   12/12/16 180/88    Weight from Last 3 Encounters:   03/01/17 73.8 kg (162 lb 9.6 oz)   02/22/17 73.6 kg (162 lb 3.2 oz)   12/12/16 73.3 kg (161 lb 9.6 oz)              Today, you had the following     No orders found for display       Primary Care Provider Office Phone # Fax #    Jules PATHAK Niki [phone]       [facility] R[facility] [address] [facility] [address]        Thank you!     Thank you for choosing [facility]  for your care. Our goal is always to provide you with excellent care. Hearing back from our patients is one way we can continue to improve our services. Please take a few minutes to complete the written survey that you may receive in the mail after your visit with us. Thank you!             Your Updated Medication List - Protect others around you: Learn how to safely use, store and throw away your medicines at www.disposemymeds.org.          This list is accurate as of: 3/3/17  3:27 PM.  Always use your most recent med list.                   Brand Name Dispense Instructions for use    ACTIGALL 300 MG capsule   Generic drug:  ursodiol          aspirin 81 MG tablet      Take 81 mg by mouth       betamethasone valerate 0.1 % ointment    VALISONE         FLUoxetine 20 MG capsule    PROzac         GLUCOSAMINE CHONDROITIN VIT D3 Caps      Take 1 each by mouth       lisinopril 40 MG tablet    PRINIVIL/ZESTRIL     Take 40 mg by mouth       MAGNESIUM OXIDE PO      Take 1 capsule by mouth daily       melatonin 3 MG Caps          metFORMIN 1000 MG tablet    GLUCOPHAGE     Take 500 mg by mouth       metoprolol 25 MG 24 hr tablet    TOPROL-XL     Take 25 mg by mouth       NYQUIL PO      Take by mouth nightly as needed (for sleep)       PRESERVISION AREDS Tabs          TURMERIC PO      1 each

## 2017-03-03 NOTE — PROGRESS NOTES
Christie Jackson received radiation therapy treatment today 03/03/17.    Suman Leonardo  March 3, 2017  3:27 PM

## 2017-03-06 ENCOUNTER — ALLIED HEALTH/NURSE VISIT (OUTPATIENT)
Dept: RADIATION ONCOLOGY | Facility: HOSPITAL | Age: 67
End: 2017-03-06

## 2017-03-06 DIAGNOSIS — C50.912 MALIGNANT NEOPLASM OF LEFT FEMALE BREAST, UNSPECIFIED SITE OF BREAST: Primary | ICD-10-CM

## 2017-03-06 PROCEDURE — 77412 RADIATION TX DELIVERY LVL 3: CPT | Performed by: RADIOLOGY

## 2017-03-06 NOTE — MR AVS SNAPSHOT
After Visit Summary   3/6/2017    Christie Jackson    MRN: [id number]           Patient Information     Date Of Birth          1950        Visit Information        Provider Department      3/6/2017 10:30 AM [facility]        Today's Diagnoses     Malignant neoplasm of left female breast, unspecified site of breast (H)    -  1       Follow-ups after your visit        Your next 10 appointments already scheduled     Mar 07, 2017 10:30 AM CST   Treatment with [facility] CL[facility] 2100C   [facility] ([facility] )    [address] [address]   [phone]            Mar 08, 2017 10:30 AM CST   Treatment with HI CLIN[facility] 2100C   [facility] ([facility] )    [address] [address]   [phone]            Mar 08, 2017 10:45 AM CST   on treatment visit with Star Maldonado MD   [facility] ([facility] )    [address] [address]   [phone]            Mar 09, 2017 10:30 AM CST   Treatment with [facility] CL[facility] 2100C   [facility] ([facility] )    [address] [address]   [phone]            Mar 10, 2017 10:30 AM CST   Treatment with [facility] CL[facility] 2100C   [facility] ([facility] )    [address] [address]   [phone]            Mar 13, 2017 10:30 AM CDT   Treatment with [facility] CL[facility] 2100C   [facility] ([facility] )    [address] [address]   [phone]            Mar 14, 2017 10:30 AM CDT   Treatment with [facility] CL[facility] 2100C   [facility] ([facility] )    [address] [address]   [phone]            Mar 15, 2017 10:30 AM CDT   Treatment with [facility] CL[facility] 2100C   [facility] ([facility] )    [address] [address]   [phone]            Mar 15, 2017 10:45 AM  "CDT   on treatment visit with Star Maldonado MD   [facility] ([facility] )    [address] [address] [phone]            Mar 16, 2017 10:30 AM CDT   Treatment with HI CLINAC 2100C   [facility] ([facility] )    [address] [address] [phone]              Who to contact     If you have questions or need follow up information about today's clinic visit or your schedule please contact [facility] directly at [phone].  Normal or non-critical lab and imaging results will be communicated to you by [employer], letter or phone within 4 business days after the clinic has received the results. If you do not hear from us within 7 days, please contact the clinic through [employer] or phone. If you have a critical or abnormal lab result, we will notify you by phone as soon as possible.  Submit refill requests through [employer] or call your pharmacy and they will forward the refill request to us. Please allow 3 business days for your refill to be completed.          Additional Information About Your Visit        MyChart Information     [employer] lets you send messages to your doctor, view your test results, renew your prescriptions, schedule appointments and more. To sign up, go to [email or web address] . Click on \"Log in\" on the left side of the screen, which will take you to the Welcome page. Then click on \"Sign up Now\" on the right side of the page.     You will be asked to enter the access code listed below, as well as some personal information. Please follow the directions to create your username and password.     Your access code is: [email or web address]  Expires: 3/12/2017 10:41 AM     Your access code will  in 90 days. If you need help or a new code, please call your [place] clinic or [phone].        Care EveryWhere ID     This is your Care EveryWhere ID. This could be used by other organizations to access your " [place] medical records  [id number]         Blood Pressure from Last 3 Encounters:   03/01/17 168/80   02/22/17 182/84   12/12/16 180/88    Weight from Last 3 Encounters:   03/01/17 73.8 kg (162 lb 9.6 oz)   02/22/17 73.6 kg (162 lb 3.2 oz)   12/12/16 73.3 kg (161 lb 9.6 oz)              Today, you had the following     No orders found for display       Primary Care Provider Office Phone # Fax #    Jules PATHAK Niki [phone]       [facility] R[facility] [address] [facility] [address]        Thank you!     Thank you for choosing [facility]  for your care. Our goal is always to provide you with excellent care. Hearing back from our patients is one way we can continue to improve our services. Please take a few minutes to complete the written survey that you may receive in the mail after your visit with us. Thank you!             Your Updated Medication List - Protect others around you: Learn how to safely use, store and throw away your medicines at www.disposemymeds.org.          This list is accurate as of: 3/6/17 10:49 AM.  Always use your most recent med list.                   Brand Name Dispense Instructions for use    ACTIGALL 300 MG capsule   Generic drug:  ursodiol          aspirin 81 MG tablet      Take 81 mg by mouth       betamethasone valerate 0.1 % ointment    VALISONE         FLUoxetine 20 MG capsule    PROzac         GLUCOSAMINE CHONDROITIN VIT D3 Caps      Take 1 each by mouth       lisinopril 40 MG tablet    PRINIVIL/ZESTRIL     Take 40 mg by mouth       MAGNESIUM OXIDE PO      Take 1 capsule by mouth daily       melatonin 3 MG Caps          metFORMIN 1000 MG tablet    GLUCOPHAGE     Take 500 mg by mouth       metoprolol 25 MG 24 hr tablet    TOPROL-XL     Take 25 mg by mouth       NYQUIL PO      Take by mouth nightly as needed (for sleep)       PRESERVISION AREDS Tabs          TURMERIC PO      1 each

## 2017-03-06 NOTE — PROGRESS NOTES
Christie Jackson received radiation therapy treatment today 03/06/17.    Suman Leonardo  March 6, 2017  10:49 AM

## 2017-03-07 ENCOUNTER — ALLIED HEALTH/NURSE VISIT (OUTPATIENT)
Dept: RADIATION ONCOLOGY | Facility: HOSPITAL | Age: 67
End: 2017-03-07

## 2017-03-07 DIAGNOSIS — C50.912 MALIGNANT NEOPLASM OF LEFT FEMALE BREAST, UNSPECIFIED SITE OF BREAST: Primary | ICD-10-CM

## 2017-03-07 PROCEDURE — 77412 RADIATION TX DELIVERY LVL 3: CPT | Performed by: RADIOLOGY

## 2017-03-07 PROCEDURE — 77336 RADIATION PHYSICS CONSULT: CPT | Performed by: RADIOLOGY

## 2017-03-07 NOTE — MR AVS SNAPSHOT
After Visit Summary   3/7/2017    Christie Jackson    MRN: [id number]           Patient Information     Date Of Birth          1950        Visit Information        Provider Department      3/7/2017 10:30 AM [facility]        Today's Diagnoses     Malignant neoplasm of left female breast, unspecified site of breast (H)    -  1       Follow-ups after your visit        Your next 10 appointments already scheduled     Mar 08, 2017 10:30 AM CST   Treatment with HI CLINAC 2100C   [facility] ([facility] )    [address] [address] [phone]            Mar 08, 2017 10:45 AM CST   on treatment visit with Star Maldonado MD   [facility] ([facility] )    [address] [address] [phone]            Mar 09, 2017 10:30 AM CST   Treatment with HI CLIN[facility] 2100C   [facility] ([facility] )    [address] [id number] [phone]            Mar 10, 2017 10:30 AM CST   Treatment with HI CLIN[facility] 2100C   [facility] ([facility] )    [address] [address] [phone]            Mar 13, 2017 10:30 AM CDT   Treatment with HI CLINAC 2100C   [facility] ([facility] )    [address] [address]   [phone]            Mar 14, 2017 10:30 AM CDT   Treatment with HI CLIN[facility] 2100C   [facility] ([facility] )    [address] [address] [phone]            Mar 15, 2017 10:30 AM CDT   Treatment with HI CL[facility] 2100C   [facility] ([facility] )    [address] [address] [phone]            Mar 15, 2017 10:45 AM CDT   on treatment visit with Star Maldonado MD   [facility] ([facility] )    [address] [id number] [phone]            Mar 16,  " 10:30 AM CDT   Treatment with HI CLIN[facility] 2100C   [facility] ([facility] )    [address] [address] [phone]            Mar 16, 2017 10:30 AM CDT   Treatment with HI SIMULATION   [facility] ([facility] )    [address] [address] [phone]              Who to contact     If you have questions or need follow up information about today's clinic visit or your schedule please contact [facility] directly at [phone].  Normal or non-critical lab and imaging results will be communicated to you by [employer], letter or phone within 4 business days after the clinic has received the results. If you do not hear from us within 7 days, please contact the clinic through [employer] or phone. If you have a critical or abnormal lab result, we will notify you by phone as soon as possible.  Submit refill requests through [employer] or call your pharmacy and they will forward the refill request to us. Please allow 3 business days for your refill to be completed.          Additional Information About Your Visit        MyChart Information     [employer] lets you send messages to your doctor, view your test results, renew your prescriptions, schedule appointments and more. To sign up, go to [email or web address] . Click on \"Log in\" on the left side of the screen, which will take you to the Welcome page. Then click on \"Sign up Now\" on the right side of the page.     You will be asked to enter the access code listed below, as well as some personal information. Please follow the directions to create your username and password.     Your access code is: [email or web address]  Expires: 3/12/2017 10:41 AM     Your access code will  in 90 days. If you need help or a new code, please call your [place] clinic or [phone].        Care EveryWhere ID     This is your Care EveryWhere ID. This could be used by other organizations to access your " [place] medical records  [id number]         Blood Pressure from Last 3 Encounters:   03/01/17 168/80   02/22/17 182/84   12/12/16 180/88    Weight from Last 3 Encounters:   03/01/17 73.8 kg (162 lb 9.6 oz)   02/22/17 73.6 kg (162 lb 3.2 oz)   12/12/16 73.3 kg (161 lb 9.6 oz)              Today, you had the following     No orders found for display       Primary Care Provider Office Phone # Fax #    Jules PATHAK Niki [phone]       [facility] R[facility] [address] [facility] [address]        Thank you!     Thank you for choosing [facility]  for your care. Our goal is always to provide you with excellent care. Hearing back from our patients is one way we can continue to improve our services. Please take a few minutes to complete the written survey that you may receive in the mail after your visit with us. Thank you!             Your Updated Medication List - Protect others around you: Learn how to safely use, store and throw away your medicines at www.disposemymeds.org.          This list is accurate as of: 3/7/17  3:57 PM.  Always use your most recent med list.                   Brand Name Dispense Instructions for use    ACTIGALL 300 MG capsule   Generic drug:  ursodiol          aspirin 81 MG tablet      Take 81 mg by mouth       betamethasone valerate 0.1 % ointment    VALISONE         FLUoxetine 20 MG capsule    PROzac         GLUCOSAMINE CHONDROITIN VIT D3 Caps      Take 1 each by mouth       lisinopril 40 MG tablet    PRINIVIL/ZESTRIL     Take 40 mg by mouth       MAGNESIUM OXIDE PO      Take 1 capsule by mouth daily       melatonin 3 MG Caps          metFORMIN 1000 MG tablet    GLUCOPHAGE     Take 500 mg by mouth       metoprolol 25 MG 24 hr tablet    TOPROL-XL     Take 25 mg by mouth       NYQUIL PO      Take by mouth nightly as needed (for sleep)       PRESERVISION AREDS Tabs          TURMERIC PO      1 each

## 2017-03-07 NOTE — PROGRESS NOTES
Christie Jackson received radiation therapy treatment today 03/07/17.    Suman Leonardo  March 7, 2017  3:57 PM

## 2017-03-08 ENCOUNTER — OFFICE VISIT (OUTPATIENT)
Dept: RADIATION ONCOLOGY | Facility: HOSPITAL | Age: 67
End: 2017-03-08

## 2017-03-08 ENCOUNTER — ALLIED HEALTH/NURSE VISIT (OUTPATIENT)
Dept: RADIATION ONCOLOGY | Facility: HOSPITAL | Age: 67
End: 2017-03-08

## 2017-03-08 VITALS
RESPIRATION RATE: 16 BRPM | DIASTOLIC BLOOD PRESSURE: 82 MMHG | HEART RATE: 60 BPM | BODY MASS INDEX: 26.69 KG/M2 | SYSTOLIC BLOOD PRESSURE: 178 MMHG | WEIGHT: 160.4 LBS

## 2017-03-08 DIAGNOSIS — C50.912 MALIGNANT NEOPLASM OF LEFT FEMALE BREAST, UNSPECIFIED SITE OF BREAST: Primary | ICD-10-CM

## 2017-03-08 PROCEDURE — 77412 RADIATION TX DELIVERY LVL 3: CPT | Performed by: RADIOLOGY

## 2017-03-08 PROCEDURE — 77417 THER RADIOLOGY PORT IMAGE(S): CPT | Performed by: RADIOLOGY

## 2017-03-08 ASSESSMENT — PAIN SCALES - GENERAL: PAINLEVEL: NO PAIN (0)

## 2017-03-08 NOTE — MR AVS SNAPSHOT
After Visit Summary   3/8/2017    Christie Jackson    MRN: [id number]           Patient Information     Date Of Birth          1950        Visit Information        Provider Department      3/8/2017 10:45 AM HI CL[facility] IX [facility]        Today's Diagnoses     Malignant neoplasm of left female breast, unspecified site of breast (H)    -  1       Follow-ups after your visit        Your next 10 appointments already scheduled     Mar 09, 2017 10:30 AM CST   Treatment with HI CLINAC 2100C   [facility] ([facility] )    [address] [address]   [phone]            Mar 10, 2017 10:30 AM CST   Treatment with HI CLINAC 2100C   [facility] ([facility] )    [address] [address]   [phone]            Mar 13, 2017 10:30 AM CDT   Treatment with HI CLINAC 2100C   [facility] ([facility] )    [address] [address]   [phone]            Mar 14, 2017 10:30 AM CDT   Treatment with HI CLINAC 2100C   [facility] ([facility] )    [address] [address]   [phone]            Mar 15, 2017 10:30 AM CDT   Treatment with HI CLINAC 2100C   [facility] ([facility] )    [address] [address]   [phone]            Mar 15, 2017 10:45 AM CDT   on treatment visit with Star Maldonado MD   [facility] ([facility] )    [address] [address]   [phone]            Mar 16, 2017 10:30 AM CDT   Treatment with HI CLINAC 2100C   [facility] ([facility] )    [address] [address]   [phone]            Mar 16, 2017 10:30 AM CDT   Treatment with HI SIMULATION   [facility] ([facility] )    [address] [address]   [phone]            Mar 17, 2017 10:30 AM CDT  "  Treatment with HI CLINAC 2100C   [facility] Radiation Oncology ([facility] )    [address] [address] [phone]            Mar 20, 2017 10:30 AM CDT   Treatment with HI CLINAC 2100C   [facility] ([facility] )    [address] [address] [phone]              Who to contact     If you have questions or need follow up information about today's clinic visit or your schedule please contact [facility] directly at [phone].  Normal or non-critical lab and imaging results will be communicated to you by [employer], letter or phone within 4 business days after the clinic has received the results. If you do not hear from us within 7 days, please contact the clinic through [employer] or phone. If you have a critical or abnormal lab result, we will notify you by phone as soon as possible.  Submit refill requests through [employer] or call your pharmacy and they will forward the refill request to us. Please allow 3 business days for your refill to be completed.          Additional Information About Your Visit        [employer] Information     [employer] lets you send messages to your doctor, view your test results, renew your prescriptions, schedule appointments and more. To sign up, go to [email or web address] . Click on \"Log in\" on the left side of the screen, which will take you to the Welcome page. Then click on \"Sign up Now\" on the right side of the page.     You will be asked to enter the access code listed below, as well as some personal information. Please follow the directions to create your username and password.     Your access code is: [email or web address]  Expires: 3/12/2017 10:41 AM     Your access code will  in 90 days. If you need help or a new code, please call your [place] clinic or [phone].        Care EveryWhere ID     This is your Care EveryWhere ID. This could be used by other organizations to access your [place] medical " records  [id number]         Blood Pressure from Last 3 Encounters:   03/01/17 168/80   02/22/17 182/84   12/12/16 180/88    Weight from Last 3 Encounters:   03/01/17 73.8 kg (162 lb 9.6 oz)   02/22/17 73.6 kg (162 lb 3.2 oz)   12/12/16 73.3 kg (161 lb 9.6 oz)              Today, you had the following     No orders found for display       Primary Care Provider Office Phone # Fax #    Jules PATHAK Niki [phone]       [facility] [address] [facility] [address]        Thank you!     Thank you for choosing [facility]  for your care. Our goal is always to provide you with excellent care. Hearing back from our patients is one way we can continue to improve our services. Please take a few minutes to complete the written survey that you may receive in the mail after your visit with us. Thank you!             Your Updated Medication List - Protect others around you: Learn how to safely use, store and throw away your medicines at www.disposemymeds.org.          This list is accurate as of: 3/8/17 10:54 AM.  Always use your most recent med list.                   Brand Name Dispense Instructions for use    ACTIGALL 300 MG capsule   Generic drug:  ursodiol          aspirin 81 MG tablet      Take 81 mg by mouth       betamethasone valerate 0.1 % ointment    VALISONE         FLUoxetine 20 MG capsule    PROzac         GLUCOSAMINE CHONDROITIN VIT D3 Caps      Take 1 each by mouth       lisinopril 40 MG tablet    PRINIVIL/ZESTRIL     Take 40 mg by mouth       MAGNESIUM OXIDE PO      Take 1 capsule by mouth daily       melatonin 3 MG Caps          metFORMIN 1000 MG tablet    GLUCOPHAGE     Take 500 mg by mouth       metoprolol 25 MG 24 hr tablet    TOPROL-XL     Take 25 mg by mouth       NYQUIL PO      Take by mouth nightly as needed (for sleep)       PRESERVISION AREDS Tabs          TURMERIC PO      1 each

## 2017-03-08 NOTE — PROGRESS NOTES
Christie Jackson received radiation therapy treatment today 03/08/17.    Piotr Rodriguez  March 8, 2017  10:41 AM

## 2017-03-08 NOTE — MR AVS SNAPSHOT
After Visit Summary   3/8/2017    Christie Jackson    MRN: [id number]           Patient Information     Date Of Birth          1950        Visit Information        Provider Department      3/8/2017 10:45 AM Star Maldonado MD [facility]        Today's Diagnoses     Malignant neoplasm of left female breast, unspecified site of breast (H)    -  1       Follow-ups after your visit        Your next 10 appointments already scheduled     Mar 09, 2017 10:30 AM CST   Treatment with [facility] 2100C   [facility] ([facility] )    [address] [address]   [phone]            Mar 10, 2017 10:30 AM CST   Treatment with [facility] 2100C   [facility] ([facility] )    [address] [address]   [phone]            Mar 13, 2017 10:30 AM CDT   Treatment with [facility] 2100C   [facility] ([facility] )    [address] [address]   [phone]            Mar 14, 2017 10:30 AM CDT   Treatment with [facility] 2100C   [facility] ([facility] )    [address] [address]   [phone]            Mar 15, 2017 10:30 AM CDT   Treatment with [facility] 2100C   [facility] ([facility] )    [address] [address]   [phone]            Mar 15, 2017 10:45 AM CDT   on treatment visit with Star Maldonado MD   [facility] ([facility] )    [address] [address]   [phone]            Mar 16, 2017 10:30 AM CDT   Treatment with [facility] 2100C   [facility] ([facility] )    [address] [address]   [phone]            Mar 16, 2017 10:30 AM CDT   Treatment with HI SIMULATION   [facility] ([facility] )    [address] [address]   [phone]            Mar 17, 2017 10:30  "AM CDT   Treatment with [facility] 2100C   [facility] ([facility] )    [address] [address] [phone]            Mar 20, 2017 10:30 AM CDT   Treatment with [facility] CL[facility] 2100C   [facility] ([facility] )    [address] [address] [phone]              Who to contact     If you have questions or need follow up information about today's clinic visit or your schedule please contact [facility] directly at [phone].  Normal or non-critical lab and imaging results will be communicated to you by [employer], letter or phone within 4 business days after the clinic has received the results. If you do not hear from us within 7 days, please contact the clinic through [employer] or phone. If you have a critical or abnormal lab result, we will notify you by phone as soon as possible.  Submit refill requests through [employer] or call your pharmacy and they will forward the refill request to us. Please allow 3 business days for your refill to be completed.          Additional Information About Your Visit        [employer] Information     [employer] lets you send messages to your doctor, view your test results, renew your prescriptions, schedule appointments and more. To sign up, go to [email or web address] . Click on \"Log in\" on the left side of the screen, which will take you to the Welcome page. Then click on \"Sign up Now\" on the right side of the page.     You will be asked to enter the access code listed below, as well as some personal information. Please follow the directions to create your username and password.     Your access code is: [email or web address]  Expires: 3/12/2017 10:41 AM     Your access code will  in 90 days. If you need help or a new code, please call your [place] clinic or [phone].        Care EveryWhere ID     This is your Care EveryWhere ID. This could be used by other organizations to access your [place] " medical records  [id number]        Your Vitals Were     Pulse Respirations BMI (Body Mass Index)             60 16 26.69 kg/m2          Blood Pressure from Last 3 Encounters:   03/08/17 178/82   03/01/17 168/80   02/22/17 182/84    Weight from Last 3 Encounters:   03/08/17 72.8 kg (160 lb 6.4 oz)   03/01/17 73.8 kg (162 lb 9.6 oz)   02/22/17 73.6 kg (162 lb 3.2 oz)              Today, you had the following     No orders found for display       Primary Care Provider Office Phone # Fax #    Jules W Niki [phone]       [facility] [address] [facility] [address]        Thank you!     Thank you for choosing [facility]  for your care. Our goal is always to provide you with excellent care. Hearing back from our patients is one way we can continue to improve our services. Please take a few minutes to complete the written survey that you may receive in the mail after your visit with us. Thank you!             Your Updated Medication List - Protect others around you: Learn how to safely use, store and throw away your medicines at www.disposemymeds.org.          This list is accurate as of: 3/8/17  3:23 PM.  Always use your most recent med list.                   Brand Name Dispense Instructions for use    ACTIGALL 300 MG capsule   Generic drug:  ursodiol          aspirin 81 MG tablet      Take 81 mg by mouth       betamethasone valerate 0.1 % ointment    VALISONE         FLUoxetine 20 MG capsule    PROzac         GLUCOSAMINE CHONDROITIN VIT D3 Caps      Take 1 each by mouth       lisinopril 40 MG tablet    PRINIVIL/ZESTRIL     Take 40 mg by mouth       MAGNESIUM OXIDE PO      Take 1 capsule by mouth daily       melatonin 3 MG Caps          metFORMIN 1000 MG tablet    GLUCOPHAGE     Take 500 mg by mouth       metoprolol 25 MG 24 hr tablet    TOPROL-XL     Take 25 mg by mouth       NYQUIL PO      Take by mouth nightly as needed (for sleep)       PRESERVISION AREDS Tabs           TURMERIC PO      1 each

## 2017-03-08 NOTE — PROGRESS NOTES
RADIATION THERAPY PROGRESS NOTE      REFERRING PHYSICIANS:  Delfino Fierro MD; Jules Prince MD; Louis Espino MD      DIAGNOSIS:  Infiltrating ductal carcinoma of the left breast, grade 1.  ER/AZ positive, HER-2 negative, 1.6 cm primary tumor.  Stage T1c N0 M0.      RADIATION RX:  Katelyn Harris has received 3200 cGy to date for treatment of low risk breast carcinoma in the setting of breast preservation.      SUBJECTIVE:  She is still doing fairly well with treatment, notices a little progressive skin irritation.  No pruritus.      OBJECTIVE:  Weight 160.4 pounds.  Slight erythema, a bit more skin reaction in the superior-medial quadrant, nothing follicular.      IMPRESSION:  Routine tolerance to radiation therapy for breast preservation.      PLAN:  Continue treatment as planned.         SUZE TERRY MD             D: 2017 11:14   T: 2017 11:20   MT: CG      Name:     KATELYN HARRIS   MRN:      [id number]        Account:      [id number]   :      1950           Service Date: 2017      Document: [id number]       cc: Louis Fierro MD

## 2017-03-09 ENCOUNTER — ALLIED HEALTH/NURSE VISIT (OUTPATIENT)
Dept: RADIATION ONCOLOGY | Facility: HOSPITAL | Age: 67
End: 2017-03-09

## 2017-03-09 DIAGNOSIS — C50.912 MALIGNANT NEOPLASM OF LEFT FEMALE BREAST, UNSPECIFIED SITE OF BREAST: Primary | ICD-10-CM

## 2017-03-09 PROCEDURE — 77412 RADIATION TX DELIVERY LVL 3: CPT | Performed by: RADIOLOGY

## 2017-03-09 NOTE — PROGRESS NOTES
Christie Jakcson received radiation therapy treatment today 03/09/17.    Piotr Rodriguez  March 9, 2017  10:31 AM

## 2017-03-09 NOTE — MR AVS SNAPSHOT
After Visit Summary   3/9/2017    Christie Jackson    MRN: [id number]           Patient Information     Date Of Birth          1950        Visit Information        Provider Department      3/9/2017 10:15 AM [facility]        Today's Diagnoses     Malignant neoplasm of left female breast, unspecified site of breast (H)    -  1       Follow-ups after your visit        Your next 10 appointments already scheduled     Mar 10, 2017 10:30 AM CST   Treatment with [facility] 2100C   [facility] ([facility] )    [address] [address]   [phone]            Mar 13, 2017 10:30 AM CDT   Treatment with [facility] CL[facility] 2100C   [facility] ([facility] )    [address] [address] [phone]            Mar 14, 2017 10:30 AM CDT   Treatment with [facility] CL[facility] 2100C   [facility] ([facility] )    [address] [address] [phone]            Mar 15, 2017 10:30 AM CDT   Treatment with [facility] CL[facility] 2100C   [facility] ([facility] )    [address] [address] [phone]            Mar 15, 2017 10:45 AM CDT   on treatment visit with Star Maldonado MD   [facility] ([facility] )    [address] [address]   [phone]            Mar 16, 2017 10:30 AM CDT   Treatment with HI CLINAC 2100C   [facility] ([facility] )    [address] [address] [phone]            Mar 16, 2017 10:30 AM CDT   Treatment with HI SIMULATION   [facility] ([facility] )    [address] [address] [phone]            Mar 17, 2017 10:30 AM CDT   Treatment with [facility] CLIN[facility] 2100C   [facility] ([facility] )    [address] [address]   [phone]            Mar 20, 2017 10:30 AM CDT  "  Treatment with HI CLINAC 2100C   [facility] ([facility] )    [address] [address] [phone]            Mar 21, 2017 10:30 AM CDT   Treatment with HI CLINAC 2100C   [facility] ([facility] )    [address] [address] [phone]              Who to contact     If you have questions or need follow up information about today's clinic visit or your schedule please contact [facility] directly at [phone].  Normal or non-critical lab and imaging results will be communicated to you by [employer], letter or phone within 4 business days after the clinic has received the results. If you do not hear from us within 7 days, please contact the clinic through [employer] or phone. If you have a critical or abnormal lab result, we will notify you by phone as soon as possible.  Submit refill requests through [employer] or call your pharmacy and they will forward the refill request to us. Please allow 3 business days for your refill to be completed.          Additional Information About Your Visit        [employer] Information     [employer] lets you send messages to your doctor, view your test results, renew your prescriptions, schedule appointments and more. To sign up, go to [email or web address] . Click on \"Log in\" on the left side of the screen, which will take you to the Welcome page. Then click on \"Sign up Now\" on the right side of the page.     You will be asked to enter the access code listed below, as well as some personal information. Please follow the directions to create your username and password.     Your access code is: [email or web address]  Expires: 3/12/2017 10:41 AM     Your access code will  in 90 days. If you need help or a new code, please call your [place] clinic or [phone].        Care EveryWhere ID     This is your Care EveryWhere ID. This could be used by other organizations to access your [place] medical " records  [id number]         Blood Pressure from Last 3 Encounters:   03/08/17 178/82   03/01/17 168/80   02/22/17 182/84    Weight from Last 3 Encounters:   03/08/17 72.8 kg (160 lb 6.4 oz)   03/01/17 73.8 kg (162 lb 9.6 oz)   02/22/17 73.6 kg (162 lb 3.2 oz)              Today, you had the following     No orders found for display       Primary Care Provider Office Phone # Fax #    Jules PATHAK Niki [phone]       [facility] [address] [facility]  [facility] [address]        Thank you!     Thank you for choosing [facility]  for your care. Our goal is always to provide you with excellent care. Hearing back from our patients is one way we can continue to improve our services. Please take a few minutes to complete the written survey that you may receive in the mail after your visit with us. Thank you!             Your Updated Medication List - Protect others around you: Learn how to safely use, store and throw away your medicines at www.disposemymeds.org.          This list is accurate as of: 3/9/17 10:40 AM.  Always use your most recent med list.                   Brand Name Dispense Instructions for use    ACTIGALL 300 MG capsule   Generic drug:  ursodiol          aspirin 81 MG tablet      Take 81 mg by mouth       betamethasone valerate 0.1 % ointment    VALISONE         FLUoxetine 20 MG capsule    PROzac         GLUCOSAMINE CHONDROITIN VIT D3 Caps      Take 1 each by mouth       lisinopril 40 MG tablet    PRINIVIL/ZESTRIL     Take 40 mg by mouth       MAGNESIUM OXIDE PO      Take 1 capsule by mouth daily       melatonin 3 MG Caps          metFORMIN 1000 MG tablet    GLUCOPHAGE     Take 500 mg by mouth       metoprolol 25 MG 24 hr tablet    TOPROL-XL     Take 25 mg by mouth       NYQUIL PO      Take by mouth nightly as needed (for sleep)       PRESERVISION AREDS Tabs          TURMERIC PO      1 each

## 2017-03-10 ENCOUNTER — ALLIED HEALTH/NURSE VISIT (OUTPATIENT)
Dept: RADIATION ONCOLOGY | Facility: HOSPITAL | Age: 67
End: 2017-03-10

## 2017-03-10 DIAGNOSIS — C50.912 MALIGNANT NEOPLASM OF LEFT FEMALE BREAST, UNSPECIFIED SITE OF BREAST: Primary | ICD-10-CM

## 2017-03-10 PROCEDURE — 77412 RADIATION TX DELIVERY LVL 3: CPT | Performed by: RADIOLOGY

## 2017-03-10 NOTE — PROGRESS NOTES
Christie Jackson received radiation therapy treatment today 03/10/17.    Suman Leonardo  March 10, 2017  10:36 AM

## 2017-03-13 ENCOUNTER — ALLIED HEALTH/NURSE VISIT (OUTPATIENT)
Dept: RADIATION ONCOLOGY | Facility: HOSPITAL | Age: 67
End: 2017-03-13

## 2017-03-13 DIAGNOSIS — C50.912 MALIGNANT NEOPLASM OF LEFT FEMALE BREAST, UNSPECIFIED SITE OF BREAST: Primary | ICD-10-CM

## 2017-03-13 PROCEDURE — 77412 RADIATION TX DELIVERY LVL 3: CPT | Performed by: RADIOLOGY

## 2017-03-13 NOTE — MR AVS SNAPSHOT
After Visit Summary   3/13/2017    Christie Jackson    MRN: [id number]           Patient Information     Date Of Birth          1950        Visit Information        Provider Department      3/13/2017 10:30 AM [facility] 2100C [facility]        Today's Diagnoses     Malignant neoplasm of left female breast, unspecified site of breast (H)    -  1       Follow-ups after your visit        Your next 10 appointments already scheduled     Mar 14, 2017 10:30 AM CDT   Treatment with HI CL[facility] 2100C   [facility] ([facility] )    [address] [address]   [phone]            Mar 15, 2017 10:30 AM CDT   Treatment with HI CLINAC 2100C   [facility] ([facility] )    [address] [address]   [phone]            Mar 15, 2017 10:45 AM CDT   on treatment visit with Star Maldonado MD   [facility] ([facility] )    [address] [address]   [phone]            Mar 16, 2017 10:30 AM CDT   Treatment with HI CLINAC 2100C   [facility] ([facility] )    [address] [address]   [phone]            Mar 16, 2017 10:30 AM CDT   Treatment with HI SIMULATION   [facility] ([facility] )    [address] [address]   [phone]            Mar 17, 2017 10:30 AM CDT   Treatment with HI CLINAC 2100C   [facility] ([facility] )    [address] [address]   [phone]            Mar 20, 2017 10:30 AM CDT   Treatment with HI CLINAC 2100C   [facility] ([facility] )    [address] [address]   [phone]            Mar 21, 2017 10:30 AM CDT   Treatment with HI CLINAC 2100C   [facility] ([facility] )    [address] [address]   [phone]            Mar 22, 2017 10:30 AM  "CDT   Treatment with HI CLINAC 2100C   [facility] ([facility] )    [address] [address] [phone]            Mar 22, 2017 10:45 AM CDT   on treatment visit with Star Maldonado MD   [facility] ([facility] )    [address] [address] [phone]              Who to contact     If you have questions or need follow up information about today's clinic visit or your schedule please contact [facility] directly at [phone].  Normal or non-critical lab and imaging results will be communicated to you by [email or web address], letter or phone within 4 business days after the clinic has received the results. If you do not hear from us within 7 days, please contact the clinic through [email or web address] or phone. If you have a critical or abnormal lab result, we will notify you by phone as soon as possible.  Submit refill requests through [employer] or call your pharmacy and they will forward the refill request to us. Please allow 3 business days for your refill to be completed.          Additional Information About Your Visit        MyChart Information     [employer] lets you send messages to your doctor, view your test results, renew your prescriptions, schedule appointments and more. To sign up, go to [email or web address] . Click on \"Log in\" on the left side of the screen, which will take you to the Welcome page. Then click on \"Sign up Now\" on the right side of the page.     You will be asked to enter the access code listed below, as well as some personal information. Please follow the directions to create your username and password.     Your access code is: [email or web address]  Expires: 2017 10:42 AM     Your access code will  in 90 days. If you need help or a new code, please call your [place] clinic or [phone].        Care EveryWhere ID     This is your Care EveryWhere ID. This could be used by other organizations to access your " [place] medical records  [id number]         Blood Pressure from Last 3 Encounters:   03/08/17 178/82   03/01/17 168/80   02/22/17 182/84    Weight from Last 3 Encounters:   03/08/17 72.8 kg (160 lb 6.4 oz)   03/01/17 73.8 kg (162 lb 9.6 oz)   02/22/17 73.6 kg (162 lb 3.2 oz)              Today, you had the following     No orders found for display       Primary Care Provider Office Phone # Fax #    Jules PATHAK Niki [phone]       [facility] [address] [facility] [address]        Thank you!     Thank you for choosing [facility]  for your care. Our goal is always to provide you with excellent care. Hearing back from our patients is one way we can continue to improve our services. Please take a few minutes to complete the written survey that you may receive in the mail after your visit with us. Thank you!             Your Updated Medication List - Protect others around you: Learn how to safely use, store and throw away your medicines at www.disposemymeds.org.          This list is accurate as of: 3/13/17 10:42 AM.  Always use your most recent med list.                   Brand Name Dispense Instructions for use    ACTIGALL 300 MG capsule   Generic drug:  ursodiol          aspirin 81 MG tablet      Take 81 mg by mouth       betamethasone valerate 0.1 % ointment    VALISONE         FLUoxetine 20 MG capsule    PROzac         GLUCOSAMINE CHONDROITIN VIT D3 Caps      Take 1 each by mouth       lisinopril 40 MG tablet    PRINIVIL/ZESTRIL     Take 40 mg by mouth       MAGNESIUM OXIDE PO      Take 1 capsule by mouth daily       melatonin 3 MG Caps          metFORMIN 1000 MG tablet    GLUCOPHAGE     Take 500 mg by mouth       metoprolol 25 MG 24 hr tablet    TOPROL-XL     Take 25 mg by mouth       NYQUIL PO      Take by mouth nightly as needed (for sleep)       PRESERVISION AREDS Tabs          TURMERIC PO      1 each

## 2017-03-13 NOTE — PROGRESS NOTES
Christie Jackson received radiation therapy treatment today 03/13/17.    Suman eLonardo  March 13, 2017  10:42 AM

## 2017-03-14 ENCOUNTER — ALLIED HEALTH/NURSE VISIT (OUTPATIENT)
Dept: RADIATION ONCOLOGY | Facility: HOSPITAL | Age: 67
End: 2017-03-14

## 2017-03-14 DIAGNOSIS — C50.912 MALIGNANT NEOPLASM OF LEFT FEMALE BREAST, UNSPECIFIED SITE OF BREAST: Primary | ICD-10-CM

## 2017-03-14 PROCEDURE — 77412 RADIATION TX DELIVERY LVL 3: CPT | Performed by: RADIOLOGY

## 2017-03-14 PROCEDURE — 77336 RADIATION PHYSICS CONSULT: CPT | Performed by: RADIOLOGY

## 2017-03-14 NOTE — MR AVS SNAPSHOT
After Visit Summary   3/14/2017    Christie Jackson    MRN: [id number]           Patient Information     Date Of Birth          1950        Visit Information        Provider Department      3/14/2017 10:30 AM [facility]        Today's Diagnoses     Malignant neoplasm of left female breast, unspecified site of breast (H)    -  1       Follow-ups after your visit        Your next 10 appointments already scheduled     Mar 15, 2017 10:30 AM CDT   Treatment with HI CLINAC IX   [facility] ([facility] )    [address] [address]   [phone]            Mar 15, 2017 10:45 AM CDT   on treatment visit with Star Maldonado MD   [facility] ([facility] )    [address] [id number] [phone]            Mar 16, 2017 10:30 AM CDT   Treatment with [facility] CL[facility] 2100C   [facility] ([facility] )    [address] [address]   [phone]            Mar 16, 2017 10:30 AM CDT   Treatment with HI SIMULATION   [facility] ([facility] )    [address] [address]   [phone]            Mar 17, 2017 10:30 AM CDT   Treatment with HI CLINAC 2100C   [facility] ([facility] )    [address] [address]   [phone]            Mar 20, 2017 10:30 AM CDT   Treatment with HI CLINAC 2100C   [facility] ([facility] )    [address] [id number] [phone]            Mar 20, 2017 10:30 AM CDT   Treatment with HI SIMULATION   [facility] ([facility] )    [address] [address]   [phone]            Mar 21, 2017 10:30 AM CDT   Treatment with HI CLINAC 2100C   [facility] ([facility] )    [address] [address]   [phone]            Mar 22, 2017 10:30 AM CDT  "  Treatment with HI CLINAC 2100C   [facility] ([facility] )    [address] [address] [phone]            Mar 22, 2017 10:45 AM CDT   on treatment visit with Star Maldonado MD   [facility] ([facility] )    [address] [address] [phone]              Who to contact     If you have questions or need follow up information about today's clinic visit or your schedule please contact [facility] directly at [phone].  Normal or non-critical lab and imaging results will be communicated to you by Interactive Investorhart, letter or phone within 4 business days after the clinic has received the results. If you do not hear from us within 7 days, please contact the clinic through [employer]or[employer] or phone. If you have a critical or abnormal lab result, we will notify you by phone as soon as possible.  Submit refill requests through GoldenSUN or call your pharmacy and they will forward the refill request to us. Please allow 3 business days for your refill to be completed.          Additional Information About Your Visit        MyChart Information     GoldenSUN lets you send messages to your doctor, view your test results, renew your prescriptions, schedule appointments and more. To sign up, go to [email or web address] . Click on \"Log in\" on the left side of the screen, which will take you to the Welcome page. Then click on \"Sign up Now\" on the right side of the page.     You will be asked to enter the access code listed below, as well as some personal information. Please follow the directions to create your username and password.     Your access code is: [email or web address]  Expires: 2017 10:42 AM     Your access code will  in 90 days. If you need help or a new code, please call your [place] clinic or [phone].        Care EveryWhere ID     This is your Care EveryWhere ID. This could be used by other organizations to access your " [place] medical records  [id number]         Blood Pressure from Last 3 Encounters:   03/08/17 178/82   03/01/17 168/80   02/22/17 182/84    Weight from Last 3 Encounters:   03/08/17 72.8 kg (160 lb 6.4 oz)   03/01/17 73.8 kg (162 lb 9.6 oz)   02/22/17 73.6 kg (162 lb 3.2 oz)              Today, you had the following     No orders found for display       Primary Care Provider Office Phone # Fax #    Jules PATHAK Niki [phone]       [facility] [address] [facility] [address]        Thank you!     Thank you for choosing [facility]  for your care. Our goal is always to provide you with excellent care. Hearing back from our patients is one way we can continue to improve our services. Please take a few minutes to complete the written survey that you may receive in the mail after your visit with us. Thank you!             Your Updated Medication List - Protect others around you: Learn how to safely use, store and throw away your medicines at www.disposemymeds.org.          This list is accurate as of: 3/14/17 11:52 AM.  Always use your most recent med list.                   Brand Name Dispense Instructions for use    ACTIGALL 300 MG capsule   Generic drug:  ursodiol          aspirin 81 MG tablet      Take 81 mg by mouth       betamethasone valerate 0.1 % ointment    VALISONE         FLUoxetine 20 MG capsule    PROzac         GLUCOSAMINE CHONDROITIN VIT D3 Caps      Take 1 each by mouth       lisinopril 40 MG tablet    PRINIVIL/ZESTRIL     Take 40 mg by mouth       MAGNESIUM OXIDE PO      Take 1 capsule by mouth daily       melatonin 3 MG Caps          metFORMIN 1000 MG tablet    GLUCOPHAGE     Take 500 mg by mouth       metoprolol 25 MG 24 hr tablet    TOPROL-XL     Take 25 mg by mouth       NYQUIL PO      Take by mouth nightly as needed (for sleep)       PRESERVISION AREDS Tabs          TURMERIC PO      1 each

## 2017-03-14 NOTE — PROGRESS NOTES
Christie Jackson received radiation therapy treatment today 03/14/17.    Suman Leonardo  March 14, 2017  11:52 AM

## 2017-03-15 ENCOUNTER — OFFICE VISIT (OUTPATIENT)
Dept: RADIATION ONCOLOGY | Facility: HOSPITAL | Age: 67
End: 2017-03-15

## 2017-03-15 ENCOUNTER — ALLIED HEALTH/NURSE VISIT (OUTPATIENT)
Dept: RADIATION ONCOLOGY | Facility: HOSPITAL | Age: 67
End: 2017-03-15

## 2017-03-15 VITALS
DIASTOLIC BLOOD PRESSURE: 80 MMHG | HEART RATE: 68 BPM | RESPIRATION RATE: 16 BRPM | WEIGHT: 161.8 LBS | BODY MASS INDEX: 26.92 KG/M2 | SYSTOLIC BLOOD PRESSURE: 138 MMHG

## 2017-03-15 DIAGNOSIS — C50.912 MALIGNANT NEOPLASM OF LEFT FEMALE BREAST, UNSPECIFIED SITE OF BREAST: Primary | ICD-10-CM

## 2017-03-15 PROCEDURE — 77412 RADIATION TX DELIVERY LVL 3: CPT | Performed by: RADIOLOGY

## 2017-03-15 PROCEDURE — 77417 THER RADIOLOGY PORT IMAGE(S): CPT | Performed by: RADIOLOGY

## 2017-03-15 ASSESSMENT — PAIN SCALES - GENERAL: PAINLEVEL: NO PAIN (0)

## 2017-03-15 NOTE — MR AVS SNAPSHOT
After Visit Summary   3/15/2017    Christie Jackson    MRN: [id number]           Patient Information     Date Of Birth          1950        Visit Information        Provider Department      3/15/2017 10:30 AM [facility] IX [facility]        Today's Diagnoses     Malignant neoplasm of left female breast, unspecified site of breast (H)    -  1       Follow-ups after your visit        Your next 10 appointments already scheduled     Mar 15, 2017 10:45 AM CDT   on treatment visit with Star Maldonado MD   [facility] ([facility] )    [address] [address]   [phone]            Mar 16, 2017 10:30 AM CDT   Treatment with HI CLINAC 2100C   [facility] ([facility] )    [address] [address]   [phone]            Mar 17, 2017 10:30 AM CDT   Treatment with HI CLINAC 2100C   [facility] ([facility] )    [address] [address]   [phone]            Mar 20, 2017 10:30 AM CDT   Treatment with HI CLINAC 2100C   [facility] ([facility] )    [address] [address]   [phone]            Mar 20, 2017 10:30 AM CDT   Treatment with HI SIMULATION   [facility] ([facility] )    [address] [address]   [phone]            Mar 21, 2017 10:30 AM CDT   Treatment with HI CLINAC 2100C   [facility] ([facility] )    [address] [address]   [phone]            Mar 22, 2017 10:30 AM CDT   Treatment with HI CLINAC 2100C   [facility] ([facility] )    [address] [address]   [phone]            Mar 22, 2017 10:45 AM CDT   on treatment visit with Star Maldonado MD   [facility] ([facility] )    [address] [address]   [phone]            Mar 23,  " 10:30 AM CDT   Treatment with HI CLINAC 2100C   [facility] Radiation Oncology ([facility] )    [address] [address] [phone]            Mar 24, 2017 10:30 AM CDT   Treatment with HI CLINAC 2100C   [facility] Radiation Oncology ([facility] )    [address] [address] [phone]              Who to contact     If you have questions or need follow up information about today's clinic visit or your schedule please contact [facility] directly at [phone].  Normal or non-critical lab and imaging results will be communicated to you by Aldagenhart, letter or phone within 4 business days after the clinic has received the results. If you do not hear from us within 7 days, please contact the clinic through FIGSt or phone. If you have a critical or abnormal lab result, we will notify you by phone as soon as possible.  Submit refill requests through [employer] or call your pharmacy and they will forward the refill request to us. Please allow 3 business days for your refill to be completed.          Additional Information About Your Visit        MyChart Information     [employer] lets you send messages to your doctor, view your test results, renew your prescriptions, schedule appointments and more. To sign up, go to [email or web address] . Click on \"Log in\" on the left side of the screen, which will take you to the Welcome page. Then click on \"Sign up Now\" on the right side of the page.     You will be asked to enter the access code listed below, as well as some personal information. Please follow the directions to create your username and password.     Your access code is: [email or web address]  Expires: 2017 10:42 AM     Your access code will  in 90 days. If you need help or a new code, please call your [place] clinic or [phone].        Care EveryWhere ID     This is your Care EveryWhere ID. This could be used by other organizations to access " your [place] medical records  [id number]         Blood Pressure from Last 3 Encounters:   03/08/17 178/82   03/01/17 168/80   02/22/17 182/84    Weight from Last 3 Encounters:   03/08/17 72.8 kg (160 lb 6.4 oz)   03/01/17 73.8 kg (162 lb 9.6 oz)   02/22/17 73.6 kg (162 lb 3.2 oz)              Today, you had the following     No orders found for display       Primary Care Provider Office Phone # Fax #    Jules PATHAK [place] [phone]       [facility] [address] [facility] [address]        Thank you!     Thank you for choosing [facility]  for your care. Our goal is always to provide you with excellent care. Hearing back from our patients is one way we can continue to improve our services. Please take a few minutes to complete the written survey that you may receive in the mail after your visit with us. Thank you!             Your Updated Medication List - Protect others around you: Learn how to safely use, store and throw away your medicines at www.disposemymeds.org.          This list is accurate as of: 3/15/17 10:43 AM.  Always use your most recent med list.                   Brand Name Dispense Instructions for use    ACTIGALL 300 MG capsule   Generic drug:  ursodiol          aspirin 81 MG tablet      Take 81 mg by mouth       betamethasone valerate 0.1 % ointment    VALISONE         FLUoxetine 20 MG capsule    PROzac         GLUCOSAMINE CHONDROITIN VIT D3 Caps      Take 1 each by mouth       lisinopril 40 MG tablet    PRINIVIL/ZESTRIL     Take 40 mg by mouth       MAGNESIUM OXIDE PO      Take 1 capsule by mouth daily       melatonin 3 MG Caps          metFORMIN 1000 MG tablet    GLUCOPHAGE     Take 500 mg by mouth       metoprolol 25 MG 24 hr tablet    TOPROL-XL     Take 25 mg by mouth       NYQUIL PO      Take by mouth nightly as needed (for sleep)       PRESERVISION AREDS Tabs          TURMERIC PO      1 each

## 2017-03-15 NOTE — MR AVS SNAPSHOT
After Visit Summary   3/15/2017    Christie Jackson    MRN: [id number]           Patient Information     Date Of Birth          1950        Visit Information        Provider Department      3/15/2017 10:45 AM Star Maldonado MD [facility]        Today's Diagnoses     Malignant neoplasm of left female breast, unspecified site of breast (H)    -  1       Follow-ups after your visit        Your next 10 appointments already scheduled     Mar 16, 2017 10:30 AM CDT   Treatment with HI CLINAC 2100C   [facility] ([facility] )    [address] [address]   [phone]            Mar 17, 2017 10:30 AM CDT   Treatment with HI CLINAC 2100C   [facility] ([facility] )    [address] [address]   [phone]            Mar 20, 2017 10:30 AM CDT   Treatment with HI CLINAC 2100C   [facility] ([facility] )    [address] [address]   [phone]            Mar 20, 2017 10:30 AM CDT   Treatment with HI SIMULATION   [facility] ([facility] )    [address] [address]   [phone]            Mar 21, 2017 10:30 AM CDT   Treatment with HI CLINAC 2100C   [facility] ([facility] )    [address] [address]   [phone]            Mar 22, 2017 10:30 AM CDT   Treatment with HI CLINAC 2100C   [facility] ([facility] )    [address] [address]   [phone]            Mar 22, 2017 10:45 AM CDT   on treatment visit with Star Maldonado MD   [facility] ([facility] )    [address] [address]   [phone]            Mar 23, 2017 10:30 AM CDT   Treatment with HI CLINAC 2100C   [facility] ([facility] )    [address] [address]   [phone]            Mar 24, 2017  "10:30 AM CDT   Treatment with HI CLINAC 2100C   [facility]cology ([facility] )    [address] [address] [phone]            Mar 27, 2017 10:30 AM CDT   Treatment with HI CLINAC 2100C   [facility] ([facility] )    [address] [address] [phone]              Who to contact     If you have questions or need follow up information about today's clinic visit or your schedule please contact [facility] directly at [phone].  Normal or non-critical lab and imaging results will be communicated to you by ReferralMDhart, letter or phone within 4 business days after the clinic has received the results. If you do not hear from us within 7 days, please contact the clinic through ReferralMDhart or phone. If you have a critical or abnormal lab result, we will notify you by phone as soon as possible.  Submit refill requests through Planand[employer] or call your pharmacy and they will forward the refill request to us. Please allow 3 business days for your refill to be completed.          Additional Information About Your Visit        MyChart Information     Planand[employer] lets you send messages to your doctor, view your test results, renew your prescriptions, schedule appointments and more. To sign up, go to [email or web address] . Click on \"Log in\" on the left side of the screen, which will take you to the Welcome page. Then click on \"Sign up Now\" on the right side of the page.     You will be asked to enter the access code listed below, as well as some personal information. Please follow the directions to create your username and password.     Your access code is: [email or web address]  Expires: 2017 10:42 AM     Your access code will  in 90 days. If you need help or a new code, please call your [place] clinic or [phone].        Care EveryWhere ID     This is your Care EveryWhere ID. This could be used by other organizations to access your " [place] medical records  [id number]        Your Vitals Were     Pulse Respirations BMI (Body Mass Index)             68 16 26.92 kg/m2          Blood Pressure from Last 3 Encounters:   03/15/17 138/80   03/08/17 178/82   03/01/17 168/80    Weight from Last 3 Encounters:   03/15/17 73.4 kg (161 lb 12.8 oz)   03/08/17 72.8 kg (160 lb 6.4 oz)   03/01/17 73.8 kg (162 lb 9.6 oz)              Today, you had the following     No orders found for display       Primary Care Provider Office Phone # Fax #    Jules W Niki [phone]       [facility] [address] [facility] [address]        Thank you!     Thank you for choosing [facility]  for your care. Our goal is always to provide you with excellent care. Hearing back from our patients is one way we can continue to improve our services. Please take a few minutes to complete the written survey that you may receive in the mail after your visit with us. Thank you!             Your Updated Medication List - Protect others around you: Learn how to safely use, store and throw away your medicines at www.disposemymeds.org.          This list is accurate as of: 3/15/17  3:51 PM.  Always use your most recent med list.                   Brand Name Dispense Instructions for use    ACTIGALL 300 MG capsule   Generic drug:  ursodiol          aspirin 81 MG tablet      Take 81 mg by mouth       betamethasone valerate 0.1 % ointment    VALISONE         FLUoxetine 20 MG capsule    PROzac         GLUCOSAMINE CHONDROITIN VIT D3 Caps      Take 1 each by mouth       lisinopril 40 MG tablet    PRINIVIL/ZESTRIL     Take 40 mg by mouth       MAGNESIUM OXIDE PO      Take 1 capsule by mouth daily       melatonin 3 MG Caps          metFORMIN 1000 MG tablet    GLUCOPHAGE     Take 500 mg by mouth       metoprolol 25 MG 24 hr tablet    TOPROL-XL     Take 25 mg by mouth       NYQUIL PO      Take by mouth nightly as needed (for sleep)       PRESERVISION AREDS  Tabs          TURMERIC PO      1 each

## 2017-03-16 ENCOUNTER — ALLIED HEALTH/NURSE VISIT (OUTPATIENT)
Dept: RADIATION ONCOLOGY | Facility: HOSPITAL | Age: 67
End: 2017-03-16

## 2017-03-16 DIAGNOSIS — C50.912 MALIGNANT NEOPLASM OF LEFT FEMALE BREAST, UNSPECIFIED SITE OF BREAST: Primary | ICD-10-CM

## 2017-03-16 PROCEDURE — 77412 RADIATION TX DELIVERY LVL 3: CPT | Performed by: RADIOLOGY

## 2017-03-16 NOTE — MR AVS SNAPSHOT
After Visit Summary   3/16/2017    Christie Jackson    MRN: [id number]           Patient Information     Date Of Birth          1950        Visit Information        Provider Department      3/16/2017 10:30 AM [facility] 2100C [facility]        Today's Diagnoses     Malignant neoplasm of left female breast, unspecified site of breast (H)    -  1       Follow-ups after your visit        Your next 10 appointments already scheduled     Mar 17, 2017 10:30 AM CDT   Treatment with HI CLINAC 2100C   [facility] ([facility] )    [address] [address]   [phone]            Mar 20, 2017 10:30 AM CDT   Treatment with HI CLINAC 2100C   [facility] ([facility] )    [address] [address]   [phone]            Mar 20, 2017 10:30 AM CDT   Treatment with HI SIMULATION   [facility] ([facility] )    [address] [address]   [phone]            Mar 21, 2017 10:30 AM CDT   Treatment with HI CLINAC 2100C   [facility] ([facility] )    [address] [address]   [phone]            Mar 22, 2017 10:30 AM CDT   Treatment with HI CLINAC 2100C   [facility] ([facility] )    [address] [address]   [phone]            Mar 22, 2017 10:45 AM CDT   on treatment visit with Star Maldonado MD   [facility] ([facility] )    [address] [address]   [phone]            Mar 23, 2017 10:30 AM CDT   Treatment with HI CLINAC 2100C   [facility] ([facility] )    [address] [address]   [phone]            Mar 24, 2017 10:30 AM CDT   Treatment with HI CLINAC 2100C   [facility] ([facility] )    [address] [address]   [phone]            Mar 27, 2017 10:30 AM  "CDT   Treatment with HI CLINAC 2100C   [facility]iation [facility]cology ([facility] )    [address] [address] [phone]            Mar 28, 2017 10:30 AM CDT   Treatment with HI CLINAC 2100C   [facility] ([facility] )    [address] [address] [phone]              Who to contact     If you have questions or need follow up information about today's clinic visit or your schedule please contact [facility] directly at [phone].  Normal or non-critical lab and imaging results will be communicated to you by [employer], letter or phone within 4 business days after the clinic has received the results. If you do not hear from us within 7 days, please contact the clinic through Netasqt or phone. If you have a critical or abnormal lab result, we will notify you by phone as soon as possible.  Submit refill requests through "[employer] or call your pharmacy and they will forward the refill request to us. Please allow 3 business days for your refill to be completed.          Additional Information About Your Visit        [employer] Information     "[employer] lets you send messages to your doctor, view your test results, renew your prescriptions, schedule appointments and more. To sign up, go to [email or web address] . Click on \"Log in\" on the left side of the screen, which will take you to the Welcome page. Then click on \"Sign up Now\" on the right side of the page.     You will be asked to enter the access code listed below, as well as some personal information. Please follow the directions to create your username and password.     Your access code is: [email or web address]  Expires: 2017 10:42 AM     Your access code will  in 90 days. If you need help or a new code, please call your [place] clinic or [phone].        Care EveryWhere ID     This is your Care EveryWhere ID. This could be used by other organizations to access your [place] " medical records  [id number]         Blood Pressure from Last 3 Encounters:   03/15/17 138/80   03/08/17 178/82   03/01/17 168/80    Weight from Last 3 Encounters:   03/15/17 73.4 kg (161 lb 12.8 oz)   03/08/17 72.8 kg (160 lb 6.4 oz)   03/01/17 73.8 kg (162 lb 9.6 oz)              Today, you had the following     No orders found for display       Primary Care Provider Office Phone # Fax #    Jules PATHAK Niki [phone]       [facility] [address] [facility] [address]        Thank you!     Thank you for choosing [facility]  for your care. Our goal is always to provide you with excellent care. Hearing back from our patients is one way we can continue to improve our services. Please take a few minutes to complete the written survey that you may receive in the mail after your visit with us. Thank you!             Your Updated Medication List - Protect others around you: Learn how to safely use, store and throw away your medicines at www.disposemymeds.org.          This list is accurate as of: 3/16/17 10:35 AM.  Always use your most recent med list.                   Brand Name Dispense Instructions for use    ACTIGALL 300 MG capsule   Generic drug:  ursodiol          aspirin 81 MG tablet      Take 81 mg by mouth       betamethasone valerate 0.1 % ointment    VALISONE         FLUoxetine 20 MG capsule    PROzac         GLUCOSAMINE CHONDROITIN VIT D3 Caps      Take 1 each by mouth       lisinopril 40 MG tablet    PRINIVIL/ZESTRIL     Take 40 mg by mouth       MAGNESIUM OXIDE PO      Take 1 capsule by mouth daily       melatonin 3 MG Caps          metFORMIN 1000 MG tablet    GLUCOPHAGE     Take 500 mg by mouth       metoprolol 25 MG 24 hr tablet    TOPROL-XL     Take 25 mg by mouth       NYQUIL PO      Take by mouth nightly as needed (for sleep)       PRESERVISION AREDS Tabs          TURMERIC PO      1 each

## 2017-03-16 NOTE — PROGRESS NOTES
Christie Jackson received radiation therapy treatment today 03/16/17.    Suman Leonardo  March 16, 2017  10:35 AM

## 2017-03-17 ENCOUNTER — ALLIED HEALTH/NURSE VISIT (OUTPATIENT)
Dept: RADIATION ONCOLOGY | Facility: HOSPITAL | Age: 67
End: 2017-03-17

## 2017-03-17 DIAGNOSIS — C50.912 MALIGNANT NEOPLASM OF LEFT FEMALE BREAST, UNSPECIFIED SITE OF BREAST: Primary | ICD-10-CM

## 2017-03-17 PROCEDURE — 77412 RADIATION TX DELIVERY LVL 3: CPT | Performed by: RADIOLOGY

## 2017-03-17 NOTE — MR AVS SNAPSHOT
After Visit Summary   3/17/2017    Christie Jackson    MRN: [id number]           Patient Information     Date Of Birth          1950        Visit Information        Provider Department      3/17/2017 10:30 AM [facility]        Today's Diagnoses     Malignant neoplasm of left female breast, unspecified site of breast (H)    -  1       Follow-ups after your visit        Your next 10 appointments already scheduled     Mar 20, 2017 10:30 AM CDT   Treatment with HI CLINAC 2100C   [facility] ([facility] )    [address] [address]   [phone]            Mar 20, 2017 10:30 AM CDT   Treatment with HI SIMULATION   [facility] ([facility] )    [address] [address]   [phone]            Mar 21, 2017 10:30 AM CDT   Treatment with [facility] CLINAC 2100C   [facility] ([facility] )    [address] [address]   [phone]            Mar 22, 2017 10:30 AM CDT   Treatment with HI CLINAC 2100C   [facility] ([facility] )    [address] [address]   [phone]            Mar 22, 2017 10:45 AM CDT   on treatment visit with Star Maldonado MD   [facility] ([facility] )    [address] [address]   [phone]            Mar 23, 2017 10:30 AM CDT   Treatment with HI CLINAC 2100C   [facility] ([facility] )    [address] [address]   [phone]            Mar 24, 2017 10:30 AM CDT   Treatment with HI CLINAC 2100C   [facility] ([facility] )    [address] [address]   [phone]            Mar 27, 2017 10:30 AM CDT   Treatment with HI CLINAC 2100C   [facility] ([facility] )    [address] [address]   [phone]            Mar 28, 2017 10:30 AM  "CDT   Treatment with HI CLINAC 2100C   [facility] Rad[facility] ([facility] )    [address] [address] [phone]              Who to contact     If you have questions or need follow up information about today's clinic visit or your schedule please contact [facility] directly at [phone].  Normal or non-critical lab and imaging results will be communicated to you by MyChart, letter or phone within 4 business days after the clinic has received the results. If you do not hear from us within 7 days, please contact the clinic through MyChart or phone. If you have a critical or abnormal lab result, we will notify you by phone as soon as possible.  Submit refill requests through [employer] or call your pharmacy and they will forward the refill request to us. Please allow 3 business days for your refill to be completed.          Additional Information About Your Visit        [employer] Information     [employer]OK lets you send messages to your doctor, view your test results, renew your prescriptions, schedule appointments and more. To sign up, go to [email or web address] . Click on \"Log in\" on the left side of the screen, which will take you to the Welcome page. Then click on \"Sign up Now\" on the right side of the page.     You will be asked to enter the access code listed below, as well as some personal information. Please follow the directions to create your username and password.     Your access code is: [email or web address]  Expires: 2017 10:42 AM     Your access code will  in 90 days. If you need help or a new code, please call your [place] clinic or [phone].        Care EveryWhere ID     This is your Care EveryWhere ID. This could be used by other organizations to access your [place] medical records  [id number]         Blood Pressure from Last 3 Encounters:   03/15/17 138/80   17 178/82   17 168/80    Weight from Last 3 Encounters:   03/15/17 73.4 kg " (161 lb 12.8 oz)   03/08/17 72.8 kg (160 lb 6.4 oz)   03/01/17 73.8 kg (162 lb 9.6 oz)              Today, you had the following     No orders found for display       Primary Care Provider Office Phone # Fax #    Jules Prince [phone]       [facility] [address]  [facility] [address]        Thank you!     Thank you for choosing [facility]  for your care. Our goal is always to provide you with excellent care. Hearing back from our patients is one way we can continue to improve our services. Please take a few minutes to complete the written survey that you may receive in the mail after your visit with us. Thank you!             Your Updated Medication List - Protect others around you: Learn how to safely use, store and throw away your medicines at www.disposemymeds.org.          This list is accurate as of: 3/17/17 10:43 AM.  Always use your most recent med list.                   Brand Name Dispense Instructions for use    ACTIGALL 300 MG capsule   Generic drug:  ursodiol          aspirin 81 MG tablet      Take 81 mg by mouth       betamethasone valerate 0.1 % ointment    VALISONE         FLUoxetine 20 MG capsule    PROzac         GLUCOSAMINE CHONDROITIN VIT D3 Caps      Take 1 each by mouth       lisinopril 40 MG tablet    PRINIVIL/ZESTRIL     Take 40 mg by mouth       MAGNESIUM OXIDE PO      Take 1 capsule by mouth daily       melatonin 3 MG Caps          metFORMIN 1000 MG tablet    GLUCOPHAGE     Take 500 mg by mouth       metoprolol 25 MG 24 hr tablet    TOPROL-XL     Take 25 mg by mouth       NYQUIL PO      Take by mouth nightly as needed (for sleep)       PRESERVISION AREDS Tabs          TURMERIC PO      1 each

## 2017-03-17 NOTE — PROGRESS NOTES
Christie Jackson received radiation therapy treatment today 03/17/17.    Piotr Rodriguez  March 17, 2017  10:43 AM

## 2017-03-17 NOTE — PROGRESS NOTES
RADIATION THERAPY PROGRESS NOTE      REFERRING PHYSICIANS:  Delfino Fierro MD; Jules Prince MD; Louis Espino MD.      DIAGNOSIS:  Infiltrating ductal carcinoma of the left breast, grade 1.  ER/NY positive, HER-2 negative, 1.6 cm primary tumor.  Stage T1c N0 M0.      RADIATION RX:  Katelyn Harris has received 4200 cGy to date for treatment of low risk breast carcinoma in the setting of breast preservation.      SUBJECTIVE:  Doing well with her treatment.  Slight skin irritation in the superior-medial quadrant, minimally pruritic.      OBJECTIVE:  Weight 161.8 pounds.  Again, slight erythema, possible minimal follicular component.      IMPRESSION:  Routine tolerance to radiation therapy for breast preservation.  The patient does have some over-the-counter clotrimazole cream which I told her she could certainly use on her skin reaction.      PLAN:  Continue treatment as planned.         SUZE TERRY MD             D: 03/15/2017 10:36   T: 03/15/2017 10:55   MT: SK      Name:     KATELYN HARRIS   MRN:      [id number]        Account:      [id number]   :      1950           Service Date: 03/15/2017      Document: [id number]       cc: Louis Fierro MD

## 2017-03-20 ENCOUNTER — ALLIED HEALTH/NURSE VISIT (OUTPATIENT)
Dept: RADIATION ONCOLOGY | Facility: HOSPITAL | Age: 67
End: 2017-03-20
Attending: RADIOLOGY
Payer: COMMERCIAL

## 2017-03-20 ENCOUNTER — ALLIED HEALTH/NURSE VISIT (OUTPATIENT)
Dept: RADIATION ONCOLOGY | Facility: HOSPITAL | Age: 67
End: 2017-03-20

## 2017-03-20 DIAGNOSIS — C50.912 MALIGNANT NEOPLASM OF LEFT FEMALE BREAST, UNSPECIFIED SITE OF BREAST: Primary | ICD-10-CM

## 2017-03-20 PROCEDURE — 77412 RADIATION TX DELIVERY LVL 3: CPT | Performed by: RADIOLOGY

## 2017-03-20 PROCEDURE — 77334 RADIATION TREATMENT AID(S): CPT | Performed by: RADIOLOGY

## 2017-03-20 PROCEDURE — 77290 THER RAD SIMULAJ FIELD CPLX: CPT | Performed by: RADIOLOGY

## 2017-03-20 NOTE — MR AVS SNAPSHOT
After Visit Summary   3/20/2017    Christie Jackson    MRN: [id number]           Patient Information     Date Of Birth          1950        Visit Information        Provider Department      3/20/2017 10:30 AM HI SIMULATION [facility] Rad[facility]        Today's Diagnoses     Malignant neoplasm of left female breast, unspecified site of breast (H)    -  1       Follow-ups after your visit        Your next 10 appointments already scheduled     Mar 21, 2017 10:30 AM CDT   Treatment with HI CLINAC 2100C   [facility]cology ([facility] )    [address] [address] [phone]            Mar 22, 2017 10:30 AM CDT   Treatment with HI CLINAC 2100C   [facility] ([facility] )    [address] [address] [phone]            Mar 22, 2017 10:45 AM CDT   on treatment visit with Star Maldonado MD   [facility] ([facility] )    [address] [address] [phone]            Mar 23, 2017 10:30 AM CDT   Treatment with HI CLINAC 2100C   [facility] ([facility] )    [address] [id number] [phone]            Mar 24, 2017 10:30 AM CDT   Treatment with HI CLINAC 2100C   [facility] ([facility] )    [address] [address] [phone]            Mar 27, 2017 10:30 AM CDT   Treatment with HI CLINAC 2100C   [facility] ([facility] )    [address] [address] [phone]            Mar 27, 2017 10:45 AM CDT   on treatment visit with Star Maldonado MD   [facility] ([facility] )    [address] [address] [phone]            Mar 28, 2017 10:30 AM CDT   Treatment with HI CLINAC 2100C   [facility] ([facility] )    [address] [address] [phone]              Who to  "contact     If you have questions or need follow up information about today's clinic visit or your schedule please contact [facility] directly at [phone].  Normal or non-critical lab and imaging results will be communicated to you by MyChart, letter or phone within 4 business days after the clinic has received the results. If you do not hear from us within 7 days, please contact the clinic through [employer] or phone. If you have a critical or abnormal lab result, we will notify you by phone as soon as possible.  Submit refill requests through [employer] or call your pharmacy and they will forward the refill request to us. Please allow 3 business days for your refill to be completed.          Additional Information About Your Visit        [employer] Information     iTMan lets you send messages to your doctor, view your test results, renew your prescriptions, schedule appointments and more. To sign up, go to [email or web address] . Click on \"Log in\" on the left side of the screen, which will take you to the Welcome page. Then click on \"Sign up Now\" on the right side of the page.     You will be asked to enter the access code listed below, as well as some personal information. Please follow the directions to create your username and password.     Your access code is: [email or web address]  Expires: 2017 10:42 AM     Your access code will  in 90 days. If you need help or a new code, please call your [place] clinic or [phone].        Care EveryWhere ID     This is your Care EveryWhere ID. This could be used by other organizations to access your [place] medical records  [id number]         Blood Pressure from Last 3 Encounters:   03/15/17 138/80   17 178/82   17 168/80    Weight from Last 3 Encounters:   03/15/17 73.4 kg (161 lb 12.8 oz)   17 72.8 kg (160 lb 6.4 oz)   17 73.8 kg (162 lb 9.6 oz)              Today, you had the following     No orders found for display       " Primary Care Provider Office Phone # Fax #    Jules W Niki [phone]       [facility] [address]  [facility] [address]        Thank you!     Thank you for choosing [facility]  for your care. Our goal is always to provide you with excellent care. Hearing back from our patients is one way we can continue to improve our services. Please take a few minutes to complete the written survey that you may receive in the mail after your visit with us. Thank you!             Your Updated Medication List - Protect others around you: Learn how to safely use, store and throw away your medicines at www.disposemymeds.org.          This list is accurate as of: 3/20/17  1:24 PM.  Always use your most recent med list.                   Brand Name Dispense Instructions for use    ACTIGALL 300 MG capsule   Generic drug:  ursodiol          aspirin 81 MG tablet      Take 81 mg by mouth       betamethasone valerate 0.1 % ointment    VALISONE         FLUoxetine 20 MG capsule    PROzac         GLUCOSAMINE CHONDROITIN VIT D3 Caps      Take 1 each by mouth       lisinopril 40 MG tablet    PRINIVIL/ZESTRIL     Take 40 mg by mouth       MAGNESIUM OXIDE PO      Take 1 capsule by mouth daily       melatonin 3 MG Caps          metFORMIN 1000 MG tablet    GLUCOPHAGE     Take 500 mg by mouth       metoprolol 25 MG 24 hr tablet    TOPROL-XL     Take 25 mg by mouth       NYQUIL PO      Take by mouth nightly as needed (for sleep)       PRESERVISION AREDS Tabs          TURMERIC PO      1 each

## 2017-03-20 NOTE — PROGRESS NOTES
Christie Jackson received radiation therapy treatment today 03/20/17.    Piotr Rodriguez  March 20, 2017  1:25 PM

## 2017-03-20 NOTE — MR AVS SNAPSHOT
After Visit Summary   3/20/2017    Christie Jackson    MRN: [id number]           Patient Information     Date Of Birth          1950        Visit Information        Provider Department      3/20/2017 10:30 AM [facility]C [facility]        Today's Diagnoses     Malignant neoplasm of left female breast, unspecified site of breast (H)    -  1       Follow-ups after your visit        Your next 10 appointments already scheduled     Mar 21, 2017 10:30 AM CDT   Treatment with HI CLINAC 2100C   [facility] ([facility] )    [address] [address] [phone]            Mar 22, 2017 10:30 AM CDT   Treatment with HI CLINAC 2100C   [facility] ([facility] )    [address] [address] [phone]            Mar 22, 2017 10:45 AM CDT   on treatment visit with Star Maldonado MD   [facility] ([facility] )    [address] [address] [phone]            Mar 23, 2017 10:30 AM CDT   Treatment with HI CLINAC 2100C   [facility] ([facility] )    [address] [id number] [phone]            Mar 24, 2017 10:30 AM CDT   Treatment with HI CLINAC 2100C   [facility] ([facility] )    [address] [address] [phone]            Mar 27, 2017 10:30 AM CDT   Treatment with HI CLINAC 2100C   [facility] ([facility] )    [address] [address] [phone]            Mar 27, 2017 10:45 AM CDT   on treatment visit with Star Maldonado MD   [facility] ([facility] )    [address] [address] [phone]            Mar 28, 2017 10:30 AM CDT   Treatment with HI CLINAC 2100C   [facility] ([facility] )    [address] [address] [phone]              Who  "to contact     If you have questions or need follow up information about today's clinic visit or your schedule please contact [facility] directly at [phone].  Normal or non-critical lab and imaging results will be communicated to you by [employer], letter or phone within 4 business days after the clinic has received the results. If you do not hear from us within 7 days, please contact the clinic through [employer] or phone. If you have a critical or abnormal lab result, we will notify you by phone as soon as possible.  Submit refill requests through [employer] [place] or call your pharmacy and they will forward the refill request to us. Please allow 3 business days for your refill to be completed.          Additional Information About Your Visit        [employer] Information     [employer] [facility] lets you send messages to your doctor, view your test results, renew your prescriptions, schedule appointments and more. To sign up, go to [email or web address] . Click on \"Log in\" on the left side of the screen, which will take you to the Welcome page. Then click on \"Sign up Now\" on the right side of the page.     You will be asked to enter the access code listed below, as well as some personal information. Please follow the directions to create your username and password.     Your access code is: [email or web address]  Expires: 2017 10:42 AM     Your access code will  in 90 days. If you need help or a new code, please call your [place] clinic or [phone].        Care EveryWhere ID     This is your Care EveryWhere ID. This could be used by other organizations to access your [place] medical records  [id number]         Blood Pressure from Last 3 Encounters:   03/15/17 138/80   17 178/82   17 168/80    Weight from Last 3 Encounters:   03/15/17 73.4 kg (161 lb 12.8 oz)   17 72.8 kg (160 lb 6.4 oz)   17 73.8 kg (162 lb 9.6 oz)              Today, you had the following     No orders found for display       " Primary Care Provider Office Phone # Fax #    Jules W Niki [phone]       [facility] [address]  [facility] [address]        Thank you!     Thank you for choosing [facility]  for your care. Our goal is always to provide you with excellent care. Hearing back from our patients is one way we can continue to improve our services. Please take a few minutes to complete the written survey that you may receive in the mail after your visit with us. Thank you!             Your Updated Medication List - Protect others around you: Learn how to safely use, store and throw away your medicines at www.disposemymeds.org.          This list is accurate as of: 3/20/17  1:25 PM.  Always use your most recent med list.                   Brand Name Dispense Instructions for use    ACTIGALL 300 MG capsule   Generic drug:  ursodiol          aspirin 81 MG tablet      Take 81 mg by mouth       betamethasone valerate 0.1 % ointment    VALISONE         FLUoxetine 20 MG capsule    PROzac         GLUCOSAMINE CHONDROITIN VIT D3 Caps      Take 1 each by mouth       lisinopril 40 MG tablet    PRINIVIL/ZESTRIL     Take 40 mg by mouth       MAGNESIUM OXIDE PO      Take 1 capsule by mouth daily       melatonin 3 MG Caps          metFORMIN 1000 MG tablet    GLUCOPHAGE     Take 500 mg by mouth       metoprolol 25 MG 24 hr tablet    TOPROL-XL     Take 25 mg by mouth       NYQUIL PO      Take by mouth nightly as needed (for sleep)       PRESERVISION AREDS Tabs          TURMERIC PO      1 each

## 2017-03-20 NOTE — PROGRESS NOTES
RADIATION THERAPY SIMULATION NOTE      Katelyn Harris was brought into the treatment suite and placed in the clinical treatment position.  Using CT-based preplanned parameters for Gantry, couch and collimator angles as well as CT-based electron cut-out template, all preplanned simulation parameters were set up and recreated.  This was checked clinically and deemed appropriate and simulation approved.  CT information will be used for selection of treatment depth and appropriate electron energy as well.         SUZE TERRY MD             D: 2017 11:42   T: 2017 12:00   MT: FIONA      Name:     KATELYN HARRIS   MRN:      [id number]        Account:      [id number]   :      1950           Service Date: 2017      Document: [id number]

## 2017-03-21 ENCOUNTER — ALLIED HEALTH/NURSE VISIT (OUTPATIENT)
Dept: RADIATION ONCOLOGY | Facility: HOSPITAL | Age: 67
End: 2017-03-21

## 2017-03-21 DIAGNOSIS — C50.912 MALIGNANT NEOPLASM OF LEFT FEMALE BREAST, UNSPECIFIED SITE OF BREAST: Primary | ICD-10-CM

## 2017-03-21 PROCEDURE — 77412 RADIATION TX DELIVERY LVL 3: CPT | Performed by: RADIOLOGY

## 2017-03-21 PROCEDURE — 77336 RADIATION PHYSICS CONSULT: CPT | Performed by: RADIOLOGY

## 2017-03-21 NOTE — PROGRESS NOTES
Christie Jackson received radiation therapy treatment today 03/21/17.    Piotr Rodriguez  March 21, 2017  10:42 AM

## 2017-03-21 NOTE — MR AVS SNAPSHOT
After Visit Summary   3/21/2017    Christie Jackson    MRN: [id number]           Patient Information     Date Of Birth          1950        Visit Information        Provider Department      3/21/2017 10:30 AM [facility]        Today's Diagnoses     Malignant neoplasm of left female breast, unspecified site of breast (H)    -  1       Follow-ups after your visit        Your next 10 appointments already scheduled     Mar 22, 2017 10:30 AM CDT   Treatment with [facility]   [facility] ([facility] )    [address] [address] [phone]            Mar 22, 2017 10:45 AM CDT   on treatment visit with Star Maldonado MD   [facility] ([facility] )    [address] [address] [phone]            Mar 23, 2017 10:30 AM CDT   Treatment with [facility]   [facility] ([facility] )    [address] [address] [phone]            Mar 24, 2017 10:30 AM CDT   Treatment with [facility]   [facility] ([facility] )    [address] [address] [phone]            Mar 27, 2017 10:30 AM CDT   Treatment with [facility]   [facility] ([facility] )    [address] [address] [phone]            Mar 27, 2017 10:45 AM CDT   on treatment visit with Star Maldonado MD   [facility] ([facility] )    [address] [address] [phone]            Mar 28, 2017 10:30 AM CDT   Treatment with [facility]   [facility] ([facility] )    [address] [address] [phone]              Who to contact     If you have questions or need follow up information about today's clinic visit or your schedule please contact [facility] directly at [phone].  Normal  "or non-critical lab and imaging results will be communicated to you by MyChart, letter or phone within 4 business days after the clinic has received the results. If you do not hear from us within 7 days, please contact the clinic through Pinchdt or phone. If you have a critical or abnormal lab result, we will notify you by phone as soon as possible.  Submit refill requests through Cloudbuild or call your pharmacy and they will forward the refill request to us. Please allow 3 business days for your refill to be completed.          Additional Information About Your Visit        [employer] Information     Cloudbuild lets you send messages to your doctor, view your test results, renew your prescriptions, schedule appointments and more. To sign up, go to [email or web address] . Click on \"Log in\" on the left side of the screen, which will take you to the Welcome page. Then click on \"Sign up Now\" on the right side of the page.     You will be asked to enter the access code listed below, as well as some personal information. Please follow the directions to create your username and password.     Your access code is: [email or web address]  Expires: 2017 10:42 AM     Your access code will  in 90 days. If you need help or a new code, please call your [place] clinic or [phone].        Care EveryWhere ID     This is your Care EveryWhere ID. This could be used by other organizations to access your [place] medical records  [id number]         Blood Pressure from Last 3 Encounters:   03/15/17 138/80   17 178/82   17 168/80    Weight from Last 3 Encounters:   03/15/17 73.4 kg (161 lb 12.8 oz)   17 72.8 kg (160 lb 6.4 oz)   17 73.8 kg (162 lb 9.6 oz)              Today, you had the following     No orders found for display       Primary Care Provider Office Phone # Fax #    [place] W Niki [phone]       [facility] [address] [facility] [address]        Thank you!     " Thank you for choosing [facility]  for your care. Our goal is always to provide you with excellent care. Hearing back from our patients is one way we can continue to improve our services. Please take a few minutes to complete the written survey that you may receive in the mail after your visit with us. Thank you!             Your Updated Medication List - Protect others around you: Learn how to safely use, store and throw away your medicines at www.disposemymeds.org.          This list is accurate as of: 3/21/17 10:42 AM.  Always use your most recent med list.                   Brand Name Dispense Instructions for use    ACTIGALL 300 MG capsule   Generic drug:  ursodiol          aspirin 81 MG tablet      Take 81 mg by mouth       betamethasone valerate 0.1 % ointment    VALISONE         FLUoxetine 20 MG capsule    PROzac         GLUCOSAMINE CHONDROITIN VIT D3 Caps      Take 1 each by mouth       lisinopril 40 MG tablet    PRINIVIL/ZESTRIL     Take 40 mg by mouth       MAGNESIUM OXIDE PO      Take 1 capsule by mouth daily       melatonin 3 MG Caps          metFORMIN 1000 MG tablet    GLUCOPHAGE     Take 500 mg by mouth       metoprolol 25 MG 24 hr tablet    TOPROL-XL     Take 25 mg by mouth       NYQUIL PO      Take by mouth nightly as needed (for sleep)       PRESERVISION AREDS Tabs          TURMERIC PO      1 each

## 2017-03-22 ENCOUNTER — ALLIED HEALTH/NURSE VISIT (OUTPATIENT)
Dept: RADIATION ONCOLOGY | Facility: HOSPITAL | Age: 67
End: 2017-03-22

## 2017-03-22 ENCOUNTER — OFFICE VISIT (OUTPATIENT)
Dept: RADIATION ONCOLOGY | Facility: HOSPITAL | Age: 67
End: 2017-03-22

## 2017-03-22 VITALS
SYSTOLIC BLOOD PRESSURE: 168 MMHG | RESPIRATION RATE: 16 BRPM | HEART RATE: 64 BPM | DIASTOLIC BLOOD PRESSURE: 82 MMHG | WEIGHT: 161.9 LBS | BODY MASS INDEX: 26.94 KG/M2

## 2017-03-22 DIAGNOSIS — C50.912 MALIGNANT NEOPLASM OF LEFT FEMALE BREAST, UNSPECIFIED SITE OF BREAST: Primary | ICD-10-CM

## 2017-03-22 PROCEDURE — 77280 THER RAD SIMULAJ FIELD SMPL: CPT | Performed by: RADIOLOGY

## 2017-03-22 PROCEDURE — 77412 RADIATION TX DELIVERY LVL 3: CPT | Performed by: RADIOLOGY

## 2017-03-22 ASSESSMENT — PAIN SCALES - GENERAL: PAINLEVEL: NO PAIN (0)

## 2017-03-22 NOTE — PROGRESS NOTES
Christie Jackson received radiation therapy treatment today 03/22/17.    Lori Glez  March 22, 2017  2:39 PM

## 2017-03-22 NOTE — MR AVS SNAPSHOT
After Visit Summary   3/22/2017    Christie Jackson    MRN: [id number]           Patient Information     Date Of Birth          1950        Visit Information        Provider Department      3/22/2017 10:45 AM Star Maldonado MD [facility]        Today's Diagnoses     Malignant neoplasm of left female breast, unspecified site of breast (H)    -  1       Follow-ups after your visit        Your next 10 appointments already scheduled     Mar 23, 2017 10:30 AM CDT   Treatment with [facility]   [facility] ([facility] )    [address] [address] [phone]            Mar 24, 2017 10:30 AM CDT   Treatment with [facility]   [facility] ([facility] )    [address] [address] [phone]            Mar 27, 2017 10:30 AM CDT   Treatment with [facility]   [facility] ([facility] )    [address] [address] [phone]            Mar 27, 2017 10:45 AM CDT   on treatment visit with Star Maldonado MD   [facility] ([facility] )    [address] [address] [phone]            Mar 28, 2017 10:30 AM CDT   Treatment with [facility]   [facility] ([facility] )    [address] [address] [phone]              Who to contact     If you have questions or need follow up information about today's clinic visit or your schedule please contact [facility] directly at [phone].  Normal or non-critical lab and imaging results will be communicated to you by MyChart, letter or phone within 4 business days after the clinic has received the results. If you do not hear from us within 7 days, please contact the clinic through MyChart or phone. If you have a critical or abnormal lab result, we will notify you by phone as soon as possible.  Submit refill  "requests through MobFox or call your pharmacy and they will forward the refill request to us. Please allow 3 business days for your refill to be completed.          Additional Information About Your Visit        [employer] Information     MobFox lets you send messages to your doctor, view your test results, renew your prescriptions, schedule appointments and more. To sign up, go to [email or web address] . Click on \"Log in\" on the left side of the screen, which will take you to the Welcome page. Then click on \"Sign up Now\" on the right side of the page.     You will be asked to enter the access code listed below, as well as some personal information. Please follow the directions to create your username and password.     Your access code is: [email or web address]  Expires: 2017 10:42 AM     Your access code will  in 90 days. If you need help or a new code, please call your [place] clinic or [phone].        Care EveryWhere ID     This is your Care EveryWhere ID. This could be used by other organizations to access your [place] medical records  [id number]        Your Vitals Were     Pulse Respirations BMI (Body Mass Index)             64 16 26.94 kg/m2          Blood Pressure from Last 3 Encounters:   17 168/82   03/15/17 138/80   17 178/82    Weight from Last 3 Encounters:   17 73.4 kg (161 lb 14.4 oz)   03/15/17 73.4 kg (161 lb 12.8 oz)   17 72.8 kg (160 lb 6.4 oz)              Today, you had the following     No orders found for display       Primary Care Provider Office Phone # Fax #    [place] W Niki [phone]       [facility] [address] [facility] [address]        Thank you!     Thank you for choosing [facility]  for your care. Our goal is always to provide you with excellent care. Hearing back from our patients is one way we can continue to improve our services. Please take a few minutes to complete the written survey that you may " receive in the mail after your visit with us. Thank you!             Your Updated Medication List - Protect others around you: Learn how to safely use, store and throw away your medicines at www.disposemymeds.org.          This list is accurate as of: 3/22/17  2:17 PM.  Always use your most recent med list.                   Brand Name Dispense Instructions for use    ACTIGALL 300 MG capsule   Generic drug:  ursodiol          aspirin 81 MG tablet      Take 81 mg by mouth       betamethasone valerate 0.1 % ointment    VALISONE         FLUoxetine 20 MG capsule    PROzac         GLUCOSAMINE CHONDROITIN VIT D3 Caps      Take 1 each by mouth       lisinopril 40 MG tablet    PRINIVIL/ZESTRIL     Take 40 mg by mouth       MAGNESIUM OXIDE PO      Take 1 capsule by mouth daily       melatonin 3 MG Caps          metFORMIN 1000 MG tablet    GLUCOPHAGE     Take 500 mg by mouth       metoprolol 25 MG 24 hr tablet    TOPROL-XL     Take 25 mg by mouth       NYQUIL PO      Take by mouth nightly as needed (for sleep)       PRESERVISION AREDS Tabs          TURMERIC PO      1 each

## 2017-03-22 NOTE — MR AVS SNAPSHOT
After Visit Summary   3/22/2017    Christie Jackson    MRN: [id number]           Patient Information     Date Of Birth          1950        Visit Information        Provider Department      3/22/2017 10:30 AM [facility]        Today's Diagnoses     Malignant neoplasm of left female breast, unspecified site of breast (H)    -  1       Follow-ups after your visit        Your next 10 appointments already scheduled     Mar 23, 2017 10:30 AM CDT   Treatment with [facility]   [facility] ([facility] )    [address] [address] [phone]            Mar 24, 2017 10:30 AM CDT   Treatment with [facility]   [facility] ([facility] )    [address] [address] [phone]            Mar 27, 2017 10:30 AM CDT   Treatment with [facility]   [facility] ([facility] )    [address] [address] [phone]            Mar 27, 2017 10:45 AM CDT   on treatment visit with Star Maldonado MD   [facility] ([facility] )    [address] [address] [phone]            Mar 28, 2017 10:30 AM CDT   Treatment with [facility]   [facility] ([facility] )    [address] [address] [phone]              Who to contact     If you have questions or need follow up information about today's clinic visit or your schedule please contact [facility] directly at [phone].  Normal or non-critical lab and imaging results will be communicated to you by MyChart, letter or phone within 4 business days after the clinic has received the results. If you do not hear from us within 7 days, please contact the clinic through MyChart or phone. If you have a critical or abnormal lab result, we will notify you by phone as soon as possible.  Submit refill requests  "through Teralynk or call your pharmacy and they will forward the refill request to us. Please allow 3 business days for your refill to be completed.          Additional Information About Your Visit        [employer]800 Information     Teralynk lets you send messages to your doctor, view your test results, renew your prescriptions, schedule appointments and more. To sign up, go to [email or web address] . Click on \"Log in\" on the left side of the screen, which will take you to the Welcome page. Then click on \"Sign up Now\" on the right side of the page.     You will be asked to enter the access code listed below, as well as some personal information. Please follow the directions to create your username and password.     Your access code is: [email or web address]  Expires: 2017 10:42 AM     Your access code will  in 90 days. If you need help or a new code, please call your [place] clinic or [phone].        Care EveryWhere ID     This is your Care EveryWhere ID. This could be used by other organizations to access your [place] medical records  [id number]         Blood Pressure from Last 3 Encounters:   17 168/82   03/15/17 138/80   17 178/82    Weight from Last 3 Encounters:   17 73.4 kg (161 lb 14.4 oz)   03/15/17 73.4 kg (161 lb 12.8 oz)   17 72.8 kg (160 lb 6.4 oz)              Today, you had the following     No orders found for display       Primary Care Provider Office Phone # Fax #    [place] W Niki [phone]       [facility] [address] [facility] [address]        Thank you!     Thank you for choosing [facility]  for your care. Our goal is always to provide you with excellent care. Hearing back from our patients is one way we can continue to improve our services. Please take a few minutes to complete the written survey that you may receive in the mail after your visit with us. Thank you!             Your Updated Medication List - Protect " others around you: Learn how to safely use, store and throw away your medicines at www.disposemymeds.org.          This list is accurate as of: 3/22/17  2:39 PM.  Always use your most recent med list.                   Brand Name Dispense Instructions for use    ACTIGALL 300 MG capsule   Generic drug:  ursodiol          aspirin 81 MG tablet      Take 81 mg by mouth       betamethasone valerate 0.1 % ointment    VALISONE         FLUoxetine 20 MG capsule    PROzac         GLUCOSAMINE CHONDROITIN VIT D3 Caps      Take 1 each by mouth       lisinopril 40 MG tablet    PRINIVIL/ZESTRIL     Take 40 mg by mouth       MAGNESIUM OXIDE PO      Take 1 capsule by mouth daily       melatonin 3 MG Caps          metFORMIN 1000 MG tablet    GLUCOPHAGE     Take 500 mg by mouth       metoprolol 25 MG 24 hr tablet    TOPROL-XL     Take 25 mg by mouth       NYQUIL PO      Take by mouth nightly as needed (for sleep)       PRESERVISION AREDS Tabs          TURMERIC PO      1 each

## 2017-03-22 NOTE — PROGRESS NOTES
RADIATION THERAPY TREATMENT SUMMARY       REFERRING PHYSICIANS:  Delfino Fierro MD; Jules Prince MD; Louis Espino MD      DIAGNOSIS:  Infiltrating ductal carcinoma of the left breast, grade 1, ER/FL positive, HER-2 negative, 1.6 cm primary tumor.  Stage T1c N0 M0.      RADIATION RX:  The patient, Katelyn Harris has received 5000 cGy to the left breast and 200 of a 1000 cGy tumor bed boost for treatment of the above.      SUBJECTIVE:  Still doing well with treatment, minor skin irritation.      OBJECTIVE:  Weight 161.9 pounds.        PHYSICAL EXAMINATION:  Reveals moderate erythema within the treatment volume.  Slight dry desquamation in the superior-lateral quadrant.      IMPRESSION:  Routine tolerance to radiation therapy for breast preservation.      PLAN:  Continue treatment as planned.         SUZE TERRY MD             D: 2017 11:19   T: 2017 11:28   MT: carolann      Name:     KATELYN HARRIS   MRN:      [id number]        Account:      [id number]   :      1950           Service Date: 2017      Document: [id number]       cc: Louis Fierro MD

## 2017-03-23 ENCOUNTER — ALLIED HEALTH/NURSE VISIT (OUTPATIENT)
Dept: RADIATION ONCOLOGY | Facility: HOSPITAL | Age: 67
End: 2017-03-23

## 2017-03-23 DIAGNOSIS — C50.912 MALIGNANT NEOPLASM OF LEFT FEMALE BREAST, UNSPECIFIED SITE OF BREAST: Primary | ICD-10-CM

## 2017-03-23 PROCEDURE — 77412 RADIATION TX DELIVERY LVL 3: CPT | Performed by: RADIOLOGY

## 2017-03-23 NOTE — PROGRESS NOTES
Christie Jackson received radiation therapy treatment today 03/23/17.    Piotr Rordiguez  March 23, 2017  11:15 AM

## 2017-03-23 NOTE — MR AVS SNAPSHOT
After Visit Summary   3/23/2017    Christie Jackson    MRN: [id number]           Patient Information     Date Of Birth          1950        Visit Information        Provider Department      3/23/2017 10:30 AM [facility]        Today's Diagnoses     Malignant neoplasm of left female breast, unspecified site of breast (H)    -  1       Follow-ups after your visit        Your next 10 appointments already scheduled     Mar 24, 2017 10:30 AM CDT   Treatment with [facility]   [facility] ([facility] )    [address] [address] [phone]            Mar 27, 2017 10:30 AM CDT   Treatment with [facility]   [facility] ([facility] )    [address] [address] [phone]            Mar 27, 2017 10:45 AM CDT   on treatment visit with Star Maldonado MD   [facility] ([facility] )    [address] [address] [phone]            Mar 28, 2017 10:30 AM CDT   Treatment with [facility]   [facility] ([facility] )    [address] [address] [phone]              Who to contact     If you have questions or need follow up information about today's clinic visit or your schedule please contact [facility] directly at [phone].  Normal or non-critical lab and imaging results will be communicated to you by MyChart, letter or phone within 4 business days after the clinic has received the results. If you do not hear from us within 7 days, please contact the clinic through MyChart or phone. If you have a critical or abnormal lab result, we will notify you by phone as soon as possible.  Submit refill requests through Elite Daily or call your pharmacy and they will forward the refill request to us. Please allow 3 business days for your refill to be completed.          Additional Information  "About Your Visit        LoopMehart Information     [employer] lets you send messages to your doctor, view your test results, renew your prescriptions, schedule appointments and more. To sign up, go to [email or web address] . Click on \"Log in\" on the left side of the screen, which will take you to the Welcome page. Then click on \"Sign up Now\" on the right side of the page.     You will be asked to enter the access code listed below, as well as some personal information. Please follow the directions to create your username and password.     Your access code is: [email or web address]  Expires: 2017 10:42 AM     Your access code will  in 90 days. If you need help or a new code, please call your [place] clinic or [phone].        Care EveryWhere ID     This is your Care EveryWhere ID. This could be used by other organizations to access your [place] medical records  [id number]         Blood Pressure from Last 3 Encounters:   17 168/82   03/15/17 138/80   17 178/82    Weight from Last 3 Encounters:   17 73.4 kg (161 lb 14.4 oz)   03/15/17 73.4 kg (161 lb 12.8 oz)   17 72.8 kg (160 lb 6.4 oz)              Today, you had the following     No orders found for display       Primary Care Provider Office Phone # Fax #    J[place] W Niki [phone]       [address] [address]        Thank you!     Thank you for choosing [facility]  for your care. Our goal is always to provide you with excellent care. Hearing back from our patients is one way we can continue to improve our services. Please take a few minutes to complete the written survey that you may receive in the mail after your visit with us. Thank you!             Your Updated Medication List - Protect others around you: Learn how to safely use, store and throw away your medicines at www.disposemymeds.org.          This list is accurate as of: 3/23/17 11:15 AM.  Always use your " most recent med list.                   Brand Name Dispense Instructions for use    ACTIGALL 300 MG capsule   Generic drug:  ursodiol          aspirin 81 MG tablet      Take 81 mg by mouth       betamethasone valerate 0.1 % ointment    VALISONE         FLUoxetine 20 MG capsule    PROzac         GLUCOSAMINE CHONDROITIN VIT D3 Caps      Take 1 each by mouth       lisinopril 40 MG tablet    PRINIVIL/ZESTRIL     Take 40 mg by mouth       MAGNESIUM OXIDE PO      Take 1 capsule by mouth daily       melatonin 3 MG Caps          metFORMIN 1000 MG tablet    GLUCOPHAGE     Take 500 mg by mouth       metoprolol 25 MG 24 hr tablet    TOPROL-XL     Take 25 mg by mouth       NYQUIL PO      Take by mouth nightly as needed (for sleep)       PRESERVISION AREDS Tabs          TURMERIC PO      1 each

## 2017-03-24 ENCOUNTER — ALLIED HEALTH/NURSE VISIT (OUTPATIENT)
Dept: RADIATION ONCOLOGY | Facility: HOSPITAL | Age: 67
End: 2017-03-24

## 2017-03-24 DIAGNOSIS — C50.912 MALIGNANT NEOPLASM OF LEFT FEMALE BREAST, UNSPECIFIED SITE OF BREAST: Primary | ICD-10-CM

## 2017-03-24 PROCEDURE — 77412 RADIATION TX DELIVERY LVL 3: CPT | Performed by: RADIOLOGY

## 2017-03-24 NOTE — PROGRESS NOTES
Christie Jackson received radiation therapy treatment today 03/24/17.    Piotr Rodriguez  March 24, 2017  10:30 AM

## 2017-03-24 NOTE — MR AVS SNAPSHOT
After Visit Summary   3/24/2017    Christie Jackson    MRN: [id number]           Patient Information     Date Of Birth          1950        Visit Information        Provider Department      3/24/2017 10:30 AM [facility]        Today's Diagnoses     Malignant neoplasm of left female breast, unspecified site of breast (H)    -  1       Follow-ups after your visit        Your next 10 appointments already scheduled     Mar 27, 2017 10:30 AM CDT   Treatment with [facility]   [facility] ([facility] )    [address] [address] [phone]            Mar 27, 2017 10:45 AM CDT   on treatment visit with Star Maldonado MD   [facility] ([facility] )    [address] [address] [phone]            Mar 28, 2017 10:30 AM CDT   Treatment with [facility]   [facility] ([facility] )    [address] [place] [address] [phone]              Who to contact     If you have questions or need follow up information about today's clinic visit or your schedule please contact [facility] directly at [phone].  Normal or non-critical lab and imaging results will be communicated to you by MyChart, letter or phone within 4 business days after the clinic has received the results. If you do not hear from us within 7 days, please contact the clinic through MyChart or phone. If you have a critical or abnormal lab result, we will notify you by phone as soon as possible.  Submit refill requests through Adocia or call your pharmacy and they will forward the refill request to us. Please allow 3 business days for your refill to be completed.          Additional Information About Your Visit        Blippexhart Information     Adocia lets you send messages to your doctor, view your test results, renew your prescriptions, schedule appointments and more. To sign  "up, go to [email or web address] . Click on \"Log in\" on the left side of the screen, which will take you to the Welcome page. Then click on \"Sign up Now\" on the right side of the page.     You will be asked to enter the access code listed below, as well as some personal information. Please follow the directions to create your username and password.     Your access code is: [email or web address]  Expires: 2017 10:42 AM     Your access code will  in 90 days. If you need help or a new code, please call your [place] clinic or [phone].        Care EveryWhere ID     This is your Care EveryWhere ID. This could be used by other organizations to access your [place] medical records  [id number]         Blood Pressure from Last 3 Encounters:   17 168/82   03/15/17 138/80   17 178/82    Weight from Last 3 Encounters:   17 73.4 kg (161 lb 14.4 oz)   03/15/17 73.4 kg (161 lb 12.8 oz)   17 72.8 kg (160 lb 6.4 oz)              Today, you had the following     No orders found for display       Primary Care Provider Office Phone # Fax #    [place] [address] [phone]       [facility] [address] [facility] [address]        Thank you!     Thank you for choosing [facility]  for your care. Our goal is always to provide you with excellent care. Hearing back from our patients is one way we can continue to improve our services. Please take a few minutes to complete the written survey that you may receive in the mail after your visit with us. Thank you!             Your Updated Medication List - Protect others around you: Learn how to safely use, store and throw away your medicines at www.disposemymeds.org.          This list is accurate as of: 3/24/17 10:40 AM.  Always use your most recent med list.                   Brand Name Dispense Instructions for use    ACTIGALL 300 MG capsule   Generic drug:  ursodiol          aspirin 81 MG tablet      Take 81 mg by " mouth       betamethasone valerate 0.1 % ointment    VALISONE         FLUoxetine 20 MG capsule    PROzac         GLUCOSAMINE CHONDROITIN VIT D3 Caps      Take 1 each by mouth       lisinopril 40 MG tablet    PRINIVIL/ZESTRIL     Take 40 mg by mouth       MAGNESIUM OXIDE PO      Take 1 capsule by mouth daily       melatonin 3 MG Caps          metFORMIN 1000 MG tablet    GLUCOPHAGE     Take 500 mg by mouth       metoprolol 25 MG 24 hr tablet    TOPROL-XL     Take 25 mg by mouth       NYQUIL PO      Take by mouth nightly as needed (for sleep)       PRESERVISION AREDS Tabs          TURMERIC PO      1 each

## 2017-03-27 ENCOUNTER — ALLIED HEALTH/NURSE VISIT (OUTPATIENT)
Dept: RADIATION ONCOLOGY | Facility: HOSPITAL | Age: 67
End: 2017-03-27

## 2017-03-27 ENCOUNTER — OFFICE VISIT (OUTPATIENT)
Dept: RADIATION ONCOLOGY | Facility: HOSPITAL | Age: 67
End: 2017-03-27

## 2017-03-27 VITALS
SYSTOLIC BLOOD PRESSURE: 180 MMHG | DIASTOLIC BLOOD PRESSURE: 88 MMHG | BODY MASS INDEX: 26.86 KG/M2 | WEIGHT: 161.4 LBS | RESPIRATION RATE: 16 BRPM | HEART RATE: 60 BPM

## 2017-03-27 DIAGNOSIS — C50.912 MALIGNANT NEOPLASM OF LEFT FEMALE BREAST, UNSPECIFIED SITE OF BREAST: Primary | ICD-10-CM

## 2017-03-27 PROCEDURE — 77412 RADIATION TX DELIVERY LVL 3: CPT | Performed by: RADIOLOGY

## 2017-03-27 ASSESSMENT — PAIN SCALES - GENERAL: PAINLEVEL: MILD PAIN (2)

## 2017-03-27 NOTE — MR AVS SNAPSHOT
"              After Visit Summary   3/27/2017    Christie Jackson    MRN: [id number]           Patient Information     Date Of Birth          1950        Visit Information        Provider Department      3/27/2017 10:30 AM [facility]        Today's Diagnoses     Malignant neoplasm of left female breast, unspecified site of breast (H)    -  1       Follow-ups after your visit        Your next 10 appointments already scheduled     Mar 27, 2017 10:45 AM CDT   on treatment visit with Star Maldonado MD   [facility] ([facility] )    [address] [address] [phone]            Mar 28, 2017 10:30 AM CDT   Treatment with [facility]   [facility] ([facility] )    [address] [address] [phone]              Who to contact     If you have questions or need follow up information about today's clinic visit or your schedule please contact [facility] directly at [phone].  Normal or non-critical lab and imaging results will be communicated to you by One on One Marketinghart, letter or phone within 4 business days after the clinic has received the results. If you do not hear from us within 7 days, please contact the clinic through WAKU WAKU ????t or phone. If you have a critical or abnormal lab result, we will notify you by phone as soon as possible.  Submit refill requests through iLinc or call your pharmacy and they will forward the refill request to us. Please allow 3 business days for your refill to be completed.          Additional Information About Your Visit        iLinc Information     iLinc lets you send messages to your doctor, view your test results, renew your prescriptions, schedule appointments and more. To sign up, go to [email or web address] . Click on \"Log in\" on the left side of the screen, which will take you to the Welcome page. Then click on \"Sign up Now\" on the right side of the " page.     You will be asked to enter the access code listed below, as well as some personal information. Please follow the directions to create your username and password.     Your access code is: [email or web address]  Expires: 2017 10:42 AM     Your access code will  in 90 days. If you need help or a new code, please call your [place] clinic or [phone].        Care EveryWhere ID     This is your Care EveryWhere ID. This could be used by other organizations to access your [place] medical records  [id number]         Blood Pressure from Last 3 Encounters:   17 168/82   03/15/17 138/80   17 178/82    Weight from Last 3 Encounters:   17 73.4 kg (161 lb 14.4 oz)   03/15/17 73.4 kg (161 lb 12.8 oz)   17 72.8 kg (160 lb 6.4 oz)              Today, you had the following     No orders found for display       Primary Care Provider Office Phone # Fax #    [place] [address] [phone]       [facility] [address] [facility] [address]        Thank you!     Thank you for choosing [facility]  for your care. Our goal is always to provide you with excellent care. Hearing back from our patients is one way we can continue to improve our services. Please take a few minutes to complete the written survey that you may receive in the mail after your visit with us. Thank you!             Your Updated Medication List - Protect others around you: Learn how to safely use, store and throw away your medicines at www.disposemymeds.org.          This list is accurate as of: 3/27/17 10:38 AM.  Always use your most recent med list.                   Brand Name Dispense Instructions for use    ACTIGALL 300 MG capsule   Generic drug:  ursodiol          aspirin 81 MG tablet      Take 81 mg by mouth       betamethasone valerate 0.1 % ointment    VALISONE         FLUoxetine 20 MG capsule    PROzac         GLUCOSAMINE CHONDROITIN VIT D3 Caps      Take 1 each by mouth        lisinopril 40 MG tablet    PRINIVIL/ZESTRIL     Take 40 mg by mouth       MAGNESIUM OXIDE PO      Take 1 capsule by mouth daily       melatonin 3 MG Caps          metFORMIN 1000 MG tablet    GLUCOPHAGE     Take 500 mg by mouth       metoprolol 25 MG 24 hr tablet    TOPROL-XL     Take 25 mg by mouth       NYQUIL PO      Take by mouth nightly as needed (for sleep)       PRESERVISION AREDS Tabs          TURMERIC PO      1 each

## 2017-03-27 NOTE — MR AVS SNAPSHOT
"              After Visit Summary   3/27/2017    Christie Jackson    MRN: [id number]           Patient Information     Date Of Birth          1950        Visit Information        Provider Department      3/27/2017 10:45 AM Star Maldonado MD [facility]        Today's Diagnoses     Malignant neoplasm of left female breast, unspecified site of breast (H)    -  1       Follow-ups after your visit        Your next 10 appointments already scheduled     Mar 28, 2017 10:30 AM CDT   Treatment with [facility]   [facility] ([facility] )    [address] [place] [address] [phone]              Who to contact     If you have questions or need follow up information about today's clinic visit or your schedule please contact [facility] directly at [phone].  Normal or non-critical lab and imaging results will be communicated to you by MyChart, letter or phone within 4 business days after the clinic has received the results. If you do not hear from us within 7 days, please contact the clinic through CRAVEhart or phone. If you have a critical or abnormal lab result, we will notify you by phone as soon as possible.  Submit refill requests through Kinsights or call your pharmacy and they will forward the refill request to us. Please allow 3 business days for your refill to be completed.          Additional Information About Your Visit        CRAVEhart Information     Kinsights lets you send messages to your doctor, view your test results, renew your prescriptions, schedule appointments and more. To sign up, go to [email or web address] . Click on \"Log in\" on the left side of the screen, which will take you to the Welcome page. Then click on \"Sign up Now\" on the right side of the page.     You will be asked to enter the access code listed below, as well as some personal information. Please follow the directions to create your username and password.     Your access " code is: [email or web address]  Expires: 2017 10:42 AM     Your access code will  in 90 days. If you need help or a new code, please call your [place] clinic or [phone].        Care EveryWhere ID     This is your Care EveryWhere ID. This could be used by other organizations to access your [place] medical records  [id number]        Your Vitals Were     Pulse Respirations BMI (Body Mass Index)             60 16 26.86 kg/m2          Blood Pressure from Last 3 Encounters:   17 180/88   17 168/82   03/15/17 138/80    Weight from Last 3 Encounters:   17 73.2 kg (161 lb 6.4 oz)   17 73.4 kg (161 lb 14.4 oz)   03/15/17 73.4 kg (161 lb 12.8 oz)              Today, you had the following     No orders found for display       Primary Care Provider Office Phone # Fax #    [place] [facility] [address] [phone]       [address] [address]        Thank you!     Thank you for choosing [facility]  for your care. Our goal is always to provide you with excellent care. Hearing back from our patients is one way we can continue to improve our services. Please take a few minutes to complete the written survey that you may receive in the mail after your visit with us. Thank you!             Your Updated Medication List - Protect others around you: Learn how to safely use, store and throw away your medicines at www.disposemymeds.org.          This list is accurate as of: 3/27/17 11:42 AM.  Always use your most recent med list.                   Brand Name Dispense Instructions for use    ACTIGALL 300 MG capsule   Generic drug:  ursodiol          aspirin 81 MG tablet      Take 81 mg by mouth       betamethasone valerate 0.1 % ointment    VALISONE         FLUoxetine 20 MG capsule    PROzac         GLUCOSAMINE CHONDROITIN VIT D3 Caps      Take 1 each by mouth       lisinopril 40 MG tablet    PRINIVIL/ZESTRIL     Take 40 mg by mouth       MAGNESIUM OXIDE PO       Take 1 capsule by mouth daily       melatonin 3 MG Caps          metFORMIN 1000 MG tablet    GLUCOPHAGE     Take 500 mg by mouth       metoprolol 25 MG 24 hr tablet    TOPROL-XL     Take 25 mg by mouth       NYQUIL PO      Take by mouth nightly as needed (for sleep)       PRESERVISION AREDS Tabs          TURMERIC PO      1 each

## 2017-03-27 NOTE — PROGRESS NOTES
Christie Jackson received radiation therapy treatment today 03/27/17.    Suman Leonardo  March 27, 2017  10:38 AM

## 2017-03-28 ENCOUNTER — ALLIED HEALTH/NURSE VISIT (OUTPATIENT)
Dept: RADIATION ONCOLOGY | Facility: HOSPITAL | Age: 67
End: 2017-03-28

## 2017-03-28 DIAGNOSIS — C50.912 MALIGNANT NEOPLASM OF LEFT FEMALE BREAST, UNSPECIFIED SITE OF BREAST: Primary | ICD-10-CM

## 2017-03-28 PROCEDURE — 77412 RADIATION TX DELIVERY LVL 3: CPT | Performed by: RADIOLOGY

## 2017-03-28 PROCEDURE — 77336 RADIATION PHYSICS CONSULT: CPT | Performed by: RADIOLOGY

## 2017-03-28 ASSESSMENT — PATIENT HEALTH QUESTIONNAIRE - PHQ9: SUM OF ALL RESPONSES TO PHQ QUESTIONS 1-9: 5

## 2017-03-28 NOTE — MR AVS SNAPSHOT
"              After Visit Summary   3/28/2017    Christie Jackson    MRN: [id number]           Patient Information     Date Of Birth          1950        Visit Information        Provider Department      3/28/2017 10:30 AM [facility]        Today's Diagnoses     Malignant neoplasm of left female breast, unspecified site of breast (H)    -  1       Follow-ups after your visit        Who to contact     If you have questions or need follow up information about today's clinic visit or your schedule please contact [facility] directly at [phone].  Normal or non-critical lab and imaging results will be communicated to you by Ajungohart, letter or phone within 4 business days after the clinic has received the results. If you do not hear from us within 7 days, please contact the clinic through "[employer]t or phone. If you have a critical or abnormal lab result, we will notify you by phone as soon as possible.  Submit refill requests through SHOP.CA or call your pharmacy and they will forward the refill request to us. Please allow 3 business days for your refill to be completed.          Additional Information About Your Visit        MyChart Information     SHOP.CA lets you send messages to your doctor, view your test results, renew your prescriptions, schedule appointments and more. To sign up, go to [email or web address] . Click on \"Log in\" on the left side of the screen, which will take you to the Welcome page. Then click on \"Sign up Now\" on the right side of the page.     You will be asked to enter the access code listed below, as well as some personal information. Please follow the directions to create your username and password.     Your access code is: [email or web address]  Expires: 2017 10:42 AM     Your access code will  in 90 days. If you need help or a new code, please call your [place] clinic or [phone].        Care EveryWhere ID     This is your Care EveryWhere ID. " This could be used by other organizations to access your [place] medical records  [id number]         Blood Pressure from Last 3 Encounters:   03/27/17 180/88   03/22/17 168/82   03/15/17 138/80    Weight from Last 3 Encounters:   03/27/17 73.2 kg (161 lb 6.4 oz)   03/22/17 73.4 kg (161 lb 14.4 oz)   03/15/17 73.4 kg (161 lb 12.8 oz)              Today, you had the following     No orders found for display       Primary Care Provider Office Phone # Fax #    [place] [address] [phone]       [facility] [address] [facility] [address]        Thank you!     Thank you for choosing [facility]  for your care. Our goal is always to provide you with excellent care. Hearing back from our patients is one way we can continue to improve our services. Please take a few minutes to complete the written survey that you may receive in the mail after your visit with us. Thank you!             Your Updated Medication List - Protect others around you: Learn how to safely use, store and throw away your medicines at www.disposemymeds.org.          This list is accurate as of: 3/28/17 10:56 AM.  Always use your most recent med list.                   Brand Name Dispense Instructions for use    ACTIGALL 300 MG capsule   Generic drug:  ursodiol          aspirin 81 MG tablet      Take 81 mg by mouth       betamethasone valerate 0.1 % ointment    VALISONE         FLUoxetine 20 MG capsule    PROzac         GLUCOSAMINE CHONDROITIN VIT D3 Caps      Take 1 each by mouth       lisinopril 40 MG tablet    PRINIVIL/ZESTRIL     Take 40 mg by mouth       MAGNESIUM OXIDE PO      Take 1 capsule by mouth daily       melatonin 3 MG Caps          metFORMIN 1000 MG tablet    GLUCOPHAGE     Take 500 mg by mouth       metoprolol 25 MG 24 hr tablet    TOPROL-XL     Take 25 mg by mouth       NYQUIL PO      Take by mouth nightly as needed (for sleep)       PRESERVISION AREDS Tabs          TURMERIC PO      1 each

## 2017-03-28 NOTE — PROGRESS NOTES
RADIATION THERAPY TREATMENT SUMMARY      REFERRING PHYSICIANS:  Delfino Fierro MD; Jules Prince MD;  Louis Espino MD.      DIAGNOSIS:  Infiltrating ductal carcinoma of the left breast, grade 1, 1.6 cm primary tumor.  Stage T1c N0 M0.  ER/WA positive, HER-2 negative.      TREATMENT INTENT:  Postoperative adjuvant.      AREA TREATED:  Left breast.      PRIMARY TREATMENT TECHNIQUE:  3D conformal.      ENERGY:  10 MV.      PRIMARY TUMOR DOSE:  5000 cGy.      NO. RX:  25 fractions.      BOOST AREA TREATED:  Tumor bed.      BOOST TREATMENT TECHNIQUE:  En face electrons.      BOOST ENERGY:  16 MeV.      BOOST TUMOR DOSE:  1000/6000 cGy.      NUMBER OF TREATMENTS:   treatments.      ELAPSED CALENDAR DAYS:  42 elapsed days.      COMPLETION DATE:  2017.        Katelyn Harris was treated with radiation therapy in the setting of breast preservation.  She tolerated her treatment very well with just the expected skin reaction, no desquamation, moderate erythema.  Recovery should be uneventful.  She will follow with Dr. Espino.  I would be happy to re-evaluate her at any time.         SUZE TERRY MD             D: 2017 10:53   T: 2017 11:04   MT: CG      Name:     KATELYN HARRIS   MRN:      [id number]        Account:      [id number]   :      1950           Service Date: 2017      Document: [id number]       cc: Louis Fierro MD

## 2017-03-28 NOTE — PROGRESS NOTES
Christie Jackson received radiation therapy treatment today 03/28/17.    Nidia Vitale  March 28, 2017  10:56 AM

## 2017-04-21 ENCOUNTER — TELEPHONE (OUTPATIENT)
Dept: RADIATION ONCOLOGY | Facility: HOSPITAL | Age: 67
End: 2017-04-21

## 2017-06-13 NOTE — PROGRESS NOTES
Christie Jackson received radiation therapy treatment today 03/15/17.    Suman Leonardo  March 15, 2017  10:43 AM     no

## 2024-11-15 NOTE — MR AVS SNAPSHOT
After Visit Summary   3/10/2017    Christie Jackson    MRN: [id number]           Patient Information     Date Of Birth          1950        Visit Information        Provider Department      3/10/2017 10:30 AM [facility]        Today's Diagnoses     Malignant neoplasm of left female breast, unspecified site of breast (H)    -  1       Follow-ups after your visit        Your next 10 appointments already scheduled     Mar 13, 2017 10:30 AM CDT   Treatment with [facility]   [facility] ([facility] )    [address] [address]   [phone]            Mar 14, 2017 10:30 AM CDT   Treatment with [facility]   [facility] ([facility] )    [address] [address] [phone]            Mar 15, 2017 10:30 AM CDT   Treatment with [facility]   [facility] ([facility] )    [address] [address]   [phone]            Mar 15, 2017 10:45 AM CDT   on treatment visit with Star Maldonado MD   [facility] ([facility] )    [address] [address]   [phone]            Mar 16, 2017 10:30 AM CDT   Treatment with [facility]   [facility] ([facility] )    [address] [address]   [phone]            Mar 16, 2017 10:30 AM CDT   Treatment with [facility]   [facility] ([facility] )    [address] [address] [phone]            Mar 17, 2017 10:30 AM CDT   Treatment with [facility]   [facility] ([facility] )    [address] [address]   [phone]            Mar 20, 2017 10:30 AM CDT   Treatment with [facility]   [facility] ([facility] )    [address] [address]   [phone]            Mar 21, 2017 10:30 AM  chest wall non-tender, breathing is unlabored without accessory muscle use, normal breath sounds "CDT   Treatment with [facility]   [facility] ([facility] )    [address] [address] [phone]            Mar 22, 2017 10:30 AM CDT   Treatment with [facility]   [facility] ([facility] )    [address] [address] [phone]              Who to contact     If you have questions or need follow up information about today's clinic visit or your schedule please contact [facility] directly at [phone].  Normal or non-critical lab and imaging results will be communicated to you by [employer], letter or phone within 4 business days after the clinic has received the results. If you do not hear from us within 7 days, please contact the clinic through [employer] or phone. If you have a critical or abnormal lab result, we will notify you by phone as soon as possible.  Submit refill requests through [employer] or call your pharmacy and they will forward the refill request to us. Please allow 3 business days for your refill to be completed.          Additional Information About Your Visit        [employer]ffit[employer] Information     [employer] lets you send messages to your doctor, view your test results, renew your prescriptions, schedule appointments and more. To sign up, go to [email or web address] . Click on \"Log in\" on the left side of the screen, which will take you to the Welcome page. Then click on \"Sign up Now\" on the right side of the page.     You will be asked to enter the access code listed below, as well as some personal information. Please follow the directions to create your username and password.     Your access code is: [email or web address]  Expires: 3/12/2017 10:41 AM     Your access code will  in 90 days. If you need help or a new code, please call your [facility] clinic or [phone].        Care EveryWhere ID     This is your Care EveryWhere ID. This could be used by other organizations to access your [facility] " medical records  [id number]         Blood Pressure from Last 3 Encounters:   03/08/17 178/82   03/01/17 168/80   02/22/17 182/84    Weight from Last 3 Encounters:   03/08/17 72.8 kg (160 lb 6.4 oz)   03/01/17 73.8 kg (162 lb 9.6 oz)   02/22/17 73.6 kg (162 lb 3.2 oz)              Today, you had the following     No orders found for display       Primary Care Provider Office Phone # Fax #    Jules PATHAK Niki [phone]       [facility] [address] [facility] [address]        Thank you!     Thank you for choosing [facility]  for your care. Our goal is always to provide you with excellent care. Hearing back from our patients is one way we can continue to improve our services. Please take a few minutes to complete the written survey that you may receive in the mail after your visit with us. Thank you!             Your Updated Medication List - Protect others around you: Learn how to safely use, store and throw away your medicines at www.disposemymeds.org.          This list is accurate as of: 3/10/17 10:53 AM.  Always use your most recent med list.                   Brand Name Dispense Instructions for use    ACTIGALL 300 MG capsule   Generic drug:  ursodiol          aspirin 81 MG tablet      Take 81 mg by mouth       betamethasone valerate 0.1 % ointment    VALISONE         FLUoxetine 20 MG capsule    PROzac         GLUCOSAMINE CHONDROITIN VIT D3 Caps      Take 1 each by mouth       lisinopril 40 MG tablet    PRINIVIL/ZESTRIL     Take 40 mg by mouth       MAGNESIUM OXIDE PO      Take 1 capsule by mouth daily       melatonin 3 MG Caps          metFORMIN 1000 MG tablet    GLUCOPHAGE     Take 500 mg by mouth       metoprolol 25 MG 24 hr tablet    TOPROL-XL     Take 25 mg by mouth       NYQUIL PO      Take by mouth nightly as needed (for sleep)       PRESERVISION AREDS Tabs          TURMERIC PO      1 each